# Patient Record
Sex: MALE | Race: NATIVE HAWAIIAN OR OTHER PACIFIC ISLANDER | URBAN - METROPOLITAN AREA
[De-identification: names, ages, dates, MRNs, and addresses within clinical notes are randomized per-mention and may not be internally consistent; named-entity substitution may affect disease eponyms.]

---

## 2018-06-20 ENCOUNTER — MEDICAL CORRESPONDENCE (OUTPATIENT)
Dept: HEALTH INFORMATION MANAGEMENT | Facility: CLINIC | Age: 78
End: 2018-06-20

## 2018-06-20 ENCOUNTER — TRANSFERRED RECORDS (OUTPATIENT)
Dept: HEALTH INFORMATION MANAGEMENT | Facility: CLINIC | Age: 78
End: 2018-06-20

## 2018-06-21 ENCOUNTER — TELEPHONE (OUTPATIENT)
Dept: OPHTHALMOLOGY | Facility: CLINIC | Age: 78
End: 2018-06-21

## 2018-06-21 ENCOUNTER — TRANSFERRED RECORDS (OUTPATIENT)
Dept: HEALTH INFORMATION MANAGEMENT | Facility: CLINIC | Age: 78
End: 2018-06-21

## 2018-06-21 NOTE — TELEPHONE ENCOUNTER
Note to /faciliator for assistance scheduling urgent referral  Notes have been sent per message  Alejo Witt RN 1:57 PM 06/21/18

## 2018-06-21 NOTE — TELEPHONE ENCOUNTER
[facility] Call Center    Phone Message    May a detailed message be left on voicemail: yes    Reason for Call: Other: Cathleen, a nurse  at [employer] called to refer pt for an urgent appt with Glaucoma. I informed her the first available that I could schedule and she said the pt needs to be seen sooner. She will be faxing over pt's records and requests that they be reviewed. Please review and contact pt to schedule. Thanks!     Action Taken: Message routed to:  [facility] ([facility] Eye

## 2018-06-26 ENCOUNTER — OFFICE VISIT (OUTPATIENT)
Dept: OPHTHALMOLOGY | Facility: CLINIC | Age: 78
End: 2018-06-26
Attending: OPHTHALMOLOGY
Payer: COMMERCIAL

## 2018-06-26 DIAGNOSIS — H40.52X0 NVG (NEOVASCULAR GLAUCOMA), LEFT, STAGE UNSPECIFIED: Primary | ICD-10-CM

## 2018-06-26 DIAGNOSIS — H25.20: ICD-10-CM

## 2018-06-26 DIAGNOSIS — Z96.1 PSEUDOPHAKIA OF RIGHT EYE: ICD-10-CM

## 2018-06-26 DIAGNOSIS — H40.50X0 NEOVASCULAR GLAUCOMA: ICD-10-CM

## 2018-06-26 PROCEDURE — G0463 HOSPITAL OUTPT CLINIC VISIT: HCPCS | Mod: ZF

## 2018-06-26 PROCEDURE — 92133 CPTRZD OPH DX IMG PST SGM ON: CPT | Mod: ZF | Performed by: OPHTHALMOLOGY

## 2018-06-26 PROCEDURE — 76514 ECHO EXAM OF EYE THICKNESS: CPT | Mod: ZF | Performed by: OPHTHALMOLOGY

## 2018-06-26 PROCEDURE — 92083 EXTENDED VISUAL FIELD XM: CPT | Mod: ZF | Performed by: OPHTHALMOLOGY

## 2018-06-26 RX ORDER — ACETAZOLAMIDE 500 MG/1
1 CAPSULE, EXTENDED RELEASE ORAL 2 TIMES DAILY
COMMUNITY
Start: 2018-06-20 | End: 2018-07-21

## 2018-06-26 RX ORDER — ENALAPRIL MALEATE 20 MG/1
20 TABLET ORAL ONCE
COMMUNITY
End: 2018-08-28

## 2018-06-26 RX ORDER — BRIMONIDINE TARTRATE 1.5 MG/ML
1 SOLUTION/ DROPS OPHTHALMIC 3 TIMES DAILY
COMMUNITY
Start: 2018-06-20 | End: 2019-05-28

## 2018-06-26 RX ORDER — DORZOLAMIDE HYDROCHLORIDE AND TIMOLOL MALEATE 20; 5 MG/ML; MG/ML
1 SOLUTION/ DROPS OPHTHALMIC 2 TIMES DAILY
COMMUNITY
Start: 2018-06-20 | End: 2019-05-28

## 2018-06-26 ASSESSMENT — VISUAL ACUITY
METHOD_MR: PT DECLINED MR TODAY
OD_SC+: -1
METHOD: SNELLEN - LINEAR
OS_SC: LP
OD_SC: 20/80

## 2018-06-26 ASSESSMENT — SLIT LAMP EXAM - LIDS
COMMENTS: NORMAL
COMMENTS: NORMAL

## 2018-06-26 ASSESSMENT — CUP TO DISC RATIO: OD_RATIO: 0.5

## 2018-06-26 ASSESSMENT — CONF VISUAL FIELD
METHOD: COUNTING FINGERS
OS_INFERIOR_NASAL_RESTRICTION: 1
OS_INFERIOR_TEMPORAL_RESTRICTION: 1
OD_NORMAL: 1
OS_SUPERIOR_NASAL_RESTRICTION: 1
OS_SUPERIOR_TEMPORAL_RESTRICTION: 1

## 2018-06-26 ASSESSMENT — PACHYMETRY
EXAM_DATE: 6/26/2018
OS_CT(UM): 634
OD_CT(UM): 555

## 2018-06-26 ASSESSMENT — TONOMETRY
OD_IOP_MMHG: 10
OS_IOP_MMHG: 22
IOP_METHOD: APPLANATION

## 2018-06-26 NOTE — PATIENT INSTRUCTIONS
Patient will continue on Cosopt (Timolol/Dorzolamide) which is a blue top drop 2x/day (12 hours apart) in the LEFT EYE ONLY and Alphagan (Brimonidine) which is a purple top drop 2x/day (12 hours apart) in the LEFT EYE ONLY.  Patient will discontinue and hold onto the Diamox pills and return to clinic in 2-4 weeks with repeat IOP check, B scan in the left eye and gonioscopy.  Patient will follow up with Dr. Kim for refraction for new glasses.  Pending repeat IOP check will consider proceeding with possible surgery on the left eye.

## 2018-06-26 NOTE — NURSING NOTE
Chief Complaints and History of Present Illnesses   Patient presents with     Glaucoma Evaluation     Referred by [facility] for worsening cataracts and neovasculat glaucoma LE     HPI    Affected eye(s):  Both   Symptoms:           Do you have eye pain now?:  No      Comments:  Referred by [facility] for Glaucoma evaluation and worsening cataracts. Pt notes not vision in LE, no pain. Vision in RE is doing well.     Thais Mejia@ [facility] 1:37 PM June 26, 2018

## 2018-06-26 NOTE — PROGRESS NOTES
1)?NVG OS -- H/O noncompliance with gtts per outside notes -- K pachy:555/634    Tmax:     HVF:  OD:Fluct    CDR: OD:0.5    HRT/OCT: OD:RNFL WNL      FHX of Glc: No      Gonio:       Intolerant to: Diamox -- fatigue, feeling tired      Asthma/COPD: No   Steroid Use: No    Kidney Stones: No    Sulfa Allergy: No      IOP targets: -- IOP accpetable  -- consider mCPC OS   2)PCIOL OD  3)Mature NS OS -- Bscan done previously that reportedly revealed an attached retina  -- IOL calcs done  4)??H/O CRVO OS per old notes s/p Avastin OS at  -- was previously following at  with Dr. Espinoza -- pt reports decrease in vision in April 2014 -- pt reports vision OS in 2018 is the same as it was in 2015 -- states that he lost all vision in 2014     MD:pt ran out of Diamox pills last night    Patient will continue on Cosopt (Timolol/Dorzolamide) which is a blue top drop 2x/day (12 hours apart) in the LEFT EYE ONLY and Alphagan (Brimonidine) which is a purple top drop 2x/day (12 hours apart) in the LEFT EYE ONLY.  Patient will discontinue and hold onto the Diamox pills and return to clinic in 2-4 weeks with repeat IOP check, B scan in the left eye and gonioscopy.  Patient will follow up with Dr. Kim for refraction for new glasses.  Pending repeat IOP check will consider proceeding with possible surgery on the left eye.      Attending Physician Attestation:  Complete documentation of historical and exam elements from today's encounter can be found in the full encounter summary report (not reduplicated in this progress note). I personally obtained the chief complaint(s) and history of present illness.  I confirmed and edited as necessary the review of systems, past medical/surgical history, family history, social history, and examination findings as documented by others; and I examined the patient myself. I personally reviewed the relevant tests, images, and reports as documented above. I formulated and edited as necessary the  assessment and plan and discussed the findings and management plan with the patient and family.  - Rubi Estrada MD

## 2018-06-26 NOTE — MR AVS SNAPSHOT
After Visit Summary   6/26/2018    Jesse Guerrero    MRN: [id number]           Patient Information     Date Of Birth          1940        Visit Information        Provider Department      6/26/2018 1:15 PM Rubi Estrada MD; [facility]        Today's Diagnoses     NVG (neovascular glaucoma), left, stage unspecified    -  1    Hypermature senile cataract        Neovascular glaucoma        Pseudophakia of right eye          Care Instructions    Patient will continue on Cosopt (Timolol/Dorzolamide) which is a blue top drop 2x/day (12 hours apart) in the LEFT EYE ONLY and Alphagan (Brimonidine) which is a purple top drop 2x/day (12 hours apart) in the LEFT EYE ONLY.  Patient will discontinue and hold onto the Diamox pills and return to clinic in 2-4 weeks with repeat IOP check, B scan in the left eye and gonioscopy.  Patient will follow up with Dr. Kim for refraction for new glasses.  Pending repeat IOP check will consider proceeding with possible surgery on the left eye.            Follow-ups after your visit        Follow-up notes from your care team     Return 3-4 weeks with repeat IOP check and Dr. Kim for refraction for new glasses.      Your next 10 appointments already scheduled     Jul 12, 2018  3:00 PM CDT   New Low Vision with Moe Kim, JAYSON   Eye Clinic ([facility])    [facility] [address]  Clinic [address] [place] [id number]   [phone]            Jul 19, 2018  9:15 AM CDT   RETURN GLAUCOMA with Rubi Estrada MD   Eye Clinic ([facility])    [address] [address]   [phone]              Future tests that were ordered for you today     Open Future Orders        Priority Expected Expires Ordered    IOL Biometry w/ IOL calc OU (both eye) Routine  12/26/2019 6/26/2018            Who to contact     Please call  your clinic at [phone] to:    Ask questions about your health    Make or cancel appointments    Discuss your medicines    Learn about your test results    Speak to your doctor            Additional Information About Your Visit        MyChart Information     [employer] is an electronic gateway that provides easy, online access to your medical records. With Cloud[employer], you can request a clinic appointment, read your test results, renew a prescription or communicate with your care team.     To sign up for [employer] visit the website at [email or web address]   You will be asked to enter the access code listed below, as well as some personal information. Please follow the directions to create your username and password.     Your access code is: [email or web address]  Expires: 2018  6:31 AM     Your access code will  in 90 days. If you need help or a new code, please contact your [facility] or call [phone] for assistance.        Care EveryWhere ID     This is your Care EveryWhere ID. This could be used by other organizations to access your [facility] medical records  [id number]         Blood Pressure from Last 3 Encounters:   No data found for BP    Weight from Last 3 Encounters:   No data found for Wt              We Performed the Following     Low Vision Central OD     OCT Optic Nerve RNFL Spectralis OU (both eyes)     PACHYMETRY        Primary Care Provider    None Specified       No primary provider on file.        Equal Access to Services     ERLINDA BRUNO : Hadii jorje travis Sodenia, waaxda luqadaha, qaybta kaalmada vamsi, lobo marquez. So [facility] [phone].    ATENCIÓN: Si habla español, tiene a gilman disposición servicios gratuitos de asistencia lingüística. Llame al [phone].    We comply with applicable federal civil rights laws and [place] laws. We do not discriminate on the basis of race, color, national origin, age, disability,  sex, sexual orientation, or gender identity.            Thank you!     Thank you for choosing EYE CLINIC  for your care. Our goal is always to provide you with excellent care. Hearing back from our patients is one way we can continue to improve our services. Please take a few minutes to complete the written survey that you may receive in the mail after your visit with us. Thank you!             Your Updated Medication List - Protect others around you: Learn how to safely use, store and throw away your medicines at www.disposemymeds.org.          This list is accurate as of 6/26/18 11:59 PM.  Always use your most recent med list.                   Brand Name Dispense Instructions for use Diagnosis    acetaZOLAMIDE 500 MG 12 hr capsule    DIAMOX SEQUEL     Take 1 capsule by mouth 2 times daily For 11 doses        brimonidine 0.15 % ophthalmic solution    ALPHAGAN-P     Place 1 drop Into the left eye 3 times daily        dorzolamide-timolol 2-0.5 % ophthalmic solution    COSOPT     Place 1 drop Into the left eye 2 times daily        enalapril 20 MG tablet    VASOTEC     Take 20 mg by mouth once

## 2018-07-12 ENCOUNTER — OFFICE VISIT (OUTPATIENT)
Dept: OPTOMETRY | Facility: CLINIC | Age: 78
End: 2018-07-12
Payer: COMMERCIAL

## 2018-07-12 DIAGNOSIS — H40.52X0 NVG (NEOVASCULAR GLAUCOMA), LEFT, STAGE UNSPECIFIED: ICD-10-CM

## 2018-07-12 DIAGNOSIS — H52.4 PRESBYOPIA: ICD-10-CM

## 2018-07-12 DIAGNOSIS — H54.1214: Primary | ICD-10-CM

## 2018-07-12 ASSESSMENT — CONF VISUAL FIELD
OS_SUPERIOR_NASAL_RESTRICTION: 1
OS_INFERIOR_NASAL_RESTRICTION: 1
OS_SUPERIOR_TEMPORAL_RESTRICTION: 1
OS_INFERIOR_TEMPORAL_RESTRICTION: 1

## 2018-07-12 ASSESSMENT — SLIT LAMP EXAM - LIDS
COMMENTS: NORMAL
COMMENTS: NORMAL

## 2018-07-12 ASSESSMENT — TONOMETRY
IOP_METHOD: ICARE
OS_IOP_MMHG: 20
OD_IOP_MMHG: 12

## 2018-07-12 ASSESSMENT — REFRACTION_MANIFEST
OD_CYLINDER: +0.50
OD_AXIS: 030
OD_ADD: +3.50
OD_SPHERE: -0.50

## 2018-07-12 NOTE — PROGRESS NOTES
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 Some improvement in BCVA evident OD today. Patient should benefit from bifocal for help with reading/mail. Patient is welcome to RTC if he experiences any prolonged adaptation difficulties with new glasses.     (H40.52X0) NVG (neovascular glaucoma), left, stage unspecified  Plan: Continue care with Dr. Estrada. Patient is scheduled for re-check in 1 week.     (H52.4) Presbyopia  Plan: SRx updated and released.       Complete documentation of historical and exam elements from today's encounter can  be found in the full encounter summary report (not reduplicated in this progress  note). I personally obtained the chief complaint(s) and history of present illness. I  confirmed and edited as necessary the review of systems, past medical/surgical  history, family history, social history, and examination findings as documented by  others; and I examined the patient myself. I personally reviewed the relevant tests,  images, and reports as documented above. I formulated and edited as necessary the  assessment and plan and discussed the findings and management plan with the  patient and family.    Moe Kim, OD

## 2018-07-12 NOTE — MR AVS SNAPSHOT
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    -  1    NVG (neovascular glaucoma), left, stage unspecified        Presbyopia           Follow-ups after your visit        Your next 10 appointments already scheduled     2018  9:15 AM CDT   RETURN GLAUCOMA with Rubi Estrada MD   Eye Clinic ([facility])    [address]  [address]  [facility] [address] [phone]              Who to contact     Please call your clinic at [phone] to:    Ask questions about your health    Make or cancel appointments    Discuss your medicines    Learn about your test results    Speak to your doctor            Additional Information About Your Visit        MyChart Information     [employer] is an electronic gateway that provides easy, online access to your medical records. With [employer], you can request a clinic appointment, read your test results, renew a prescription or communicate with your care team.     To sign up for [employer] visit the website at [email or web address]   You will be asked to enter the access code listed below, as well as some personal information. Please follow the directions to create your username and password.     Your access code is: [email or web address]  Expires: 2018  6:31 AM     Your access code will  in 90 days. If you need help or a new code, please contact your [facility] or call [phone] for assistance.        Care EveryWhere ID     This is your Care EveryWhere ID. This could be used by other organizations to access your [facility]  records  [id number]         Blood Pressure from Last 3 Encounters:   No data found for BP    Weight from Last 3 Encounters:   No data found for Wt              We Performed the Following     REFRACTION [[id number]]        Primary Care Provider    None Specified       No primary provider on file.        Equal Access to Services     ERLINDA BRUNO : Hadii jorje cami matthewo Soartali, wairamda luqadaha, qaybta kaalmada adechu, lobo riosguillermo . So [facility] [phone].    ATENCIÓN: Si habla español, tiene a gilman disposición servicios gratuitos de asistencia lingüística. Llame al [phone].    We comply with applicable federal civil rights laws and [place] laws. We do not discriminate on the basis of race, color, national origin, age, disability, sex, sexual orientation, or gender identity.            Thank you!     Thank you for choosing EYE CLINIC  for your care. Our goal is always to provide you with excellent care. Hearing back from our patients is one way we can continue to improve our services. Please take a few minutes to complete the written survey that you may receive in the mail after your visit with us. Thank you!             Your Updated Medication List - Protect others around you: Learn how to safely use, store and throw away your medicines at www.disposemymeds.org.          This list is accurate as of 7/12/18  3:42 PM.  Always use your most recent med list.                   Brand Name Dispense Instructions for use Diagnosis    acetaZOLAMIDE 500 MG 12 hr capsule    DIAMOX SEQUEL     Take 1 capsule by mouth 2 times daily For 11 doses        brimonidine 0.15 % ophthalmic solution    ALPHAGAN-P     Place 1 drop Into the left eye 3 times daily        dorzolamide-timolol 2-0.5 % ophthalmic solution    COSOPT     Place 1 drop Into the left eye 2 times daily        enalapril 20 MG tablet    VASOTEC     Take 20 mg by mouth once

## 2018-07-19 ENCOUNTER — OFFICE VISIT (OUTPATIENT)
Dept: OPHTHALMOLOGY | Facility: CLINIC | Age: 78
End: 2018-07-19
Attending: OPHTHALMOLOGY
Payer: COMMERCIAL

## 2018-07-19 DIAGNOSIS — H25.22 MORGAGNIAN AGE-RELATED CATARACT OF LEFT EYE: ICD-10-CM

## 2018-07-19 DIAGNOSIS — Z96.1 PSEUDOPHAKIA OF RIGHT EYE: ICD-10-CM

## 2018-07-19 DIAGNOSIS — H40.52X0 NVG (NEOVASCULAR GLAUCOMA), LEFT, STAGE UNSPECIFIED: Primary | ICD-10-CM

## 2018-07-19 PROCEDURE — G0463 HOSPITAL OUTPT CLINIC VISIT: HCPCS | Mod: ZF

## 2018-07-19 RX ORDER — LATANOPROST 50 UG/ML
1 SOLUTION/ DROPS OPHTHALMIC AT BEDTIME
Qty: 1 BOTTLE | Refills: 11 | Status: SHIPPED | OUTPATIENT
Start: 2018-07-19 | End: 2018-07-21

## 2018-07-19 RX ORDER — BRIMONIDINE TARTRATE 2 MG/ML
1 SOLUTION/ DROPS OPHTHALMIC 3 TIMES DAILY
Qty: 15 ML | Refills: 11 | Status: SHIPPED | OUTPATIENT
Start: 2018-07-19 | End: 2019-05-28

## 2018-07-19 RX ORDER — DORZOLAMIDE HYDROCHLORIDE AND TIMOLOL MALEATE 20; 5 MG/ML; MG/ML
1 SOLUTION/ DROPS OPHTHALMIC 2 TIMES DAILY
Qty: 1 BOTTLE | Refills: 11 | Status: SHIPPED | OUTPATIENT
Start: 2018-07-19 | End: 2019-05-28

## 2018-07-19 ASSESSMENT — TONOMETRY
IOP_METHOD: TONOPEN
IOP_METHOD: APPLANATION
OD_IOP_MMHG: 15
OD_IOP_MMHG: 14
OS_IOP_MMHG: 45
OS_IOP_MMHG: ?

## 2018-07-19 ASSESSMENT — PACHYMETRY
OS_CT(UM): 634
EXAM_DATE: 6/26/2018
OD_CT(UM): 555

## 2018-07-19 ASSESSMENT — CONF VISUAL FIELD
OS_SUPERIOR_TEMPORAL_RESTRICTION: 1
OS_INFERIOR_NASAL_RESTRICTION: 1
OS_SUPERIOR_NASAL_RESTRICTION: 1
OS_INFERIOR_TEMPORAL_RESTRICTION: 1
OD_NORMAL: 1
METHOD: COUNTING FINGERS

## 2018-07-19 ASSESSMENT — VISUAL ACUITY
OD_SC+: -1
METHOD: SNELLEN - LINEAR
OS_SC: LP WITH PROJECTION
OD_SC: 20/80

## 2018-07-19 ASSESSMENT — SLIT LAMP EXAM - LIDS
COMMENTS: NORMAL
COMMENTS: NORMAL

## 2018-07-19 NOTE — NURSING NOTE
Chief Complaints and History of Present Illnesses   Patient presents with     Follow Up For     NVG (neovascular glaucoma), left, stage unspecified     HPI    Last Eye Exam:  6/26/18   Affected eye(s):  Left   Symptoms:        Unknown duration    Frequency:  Constant       Do you have eye pain now?:  No      Comments:  Jesse is here for a follow up of NVG (neovascular glaucoma), left, stage unspecified  He says he has been feeling pain LE for the past two days, and yesterday he had took three ibuprofen  As the pain was bad.     Kyle Cordon COT 10:53 AM July 19, 2018

## 2018-07-19 NOTE — PATIENT INSTRUCTIONS
Patient will continue on Cosopt (Timolol/Dorzolamide) which is a blue top drop 2x/day (12 hours apart) in the LEFT EYE ONLY and Alphagan (Brimonidine) which is a purple top drop 2x/day (12 hours apart) in the LEFT EYE ONLY.   Patient will follow up with Dr. Kim for refraction for new glasses.  A long discussion of the risks, benefits, and alternatives including potential treatment and management options were had with patient and a decision was made to proceed with micropulse CPC (laser) in the left eye.

## 2018-07-19 NOTE — PROGRESS NOTES
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 Espinoza -- pt reports decrease in vision in April 2014 upon awakening in morning (denies H/O trauma prior to vision loss) -- pt was evaluated in [place] after vision loss and they did not offer cataract surgery OS because it would not improve his vision ... Was told eye was \"full of blood\"  -- pt reports vision OS in 2018 is the same as it was in 2015 -- states that he lost all vision in 2014     MD:pt needs  -- will call pt to schedule    Patient will continue on Cosopt (Timolol/Dorzolamide) which is a blue top drop 2x/day (12 hours apart) in the LEFT EYE ONLY and Alphagan (Brimonidine) which is a purple top drop 2x/day (12 hours apart) in the LEFT EYE ONLY.   Patient will follow up with Dr. Kim for refraction for new glasses.  A long discussion of the risks, benefits, and alternatives including potential treatment and management options were had with patient and a decision was made to proceed with micropulse CPC (laser) in the left eye.        RESIDENT NOTE:  Here for IOP check  Reports having head ache and eye pain yesterday, took 3 ibuprofen  No pain today  IOP today 49 left eye  B scan: No detachments  Start Diamox pills 500 twice a day  Plan for surgery left eye    Attending Physician Attestation:  Complete documentation of historical and exam elements from today's encounter can be found in the full " encounter summary report (not reduplicated in this progress note). I personally obtained the chief complaint(s) and history of present illness.  I confirmed and edited as necessary the review of systems, past medical/surgical history, family history, social history, and examination findings as documented by others; and I examined the patient myself. I personally reviewed the relevant tests, images, and reports as documented above. I formulated and edited as necessary the assessment and plan and discussed the findings and management plan with the patient and family.  - Rubi Estrada MD

## 2018-07-19 NOTE — MR AVS SNAPSHOT
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   Patient will follow up with Dr. Kim for refraction for new glasses.  A long discussion of the risks, benefits, and alternatives including potential treatment and management options were had with patient and a decision was made to proceed with micropulse CPC (laser) in the left eye.            Follow-ups after your visit        Your next 10 appointments already scheduled     Aug 28, 2018  1:00 PM CDT   Post-Op with Rubi Estrada MD   Eye Clinic ([facility])    [address] [id number] [phone]              Who to contact     Please call your clinic at [phone] to:    Ask questions about your health    Make or cancel appointments    Discuss your medicines    Learn about your test results    Speak to your doctor            Additional Information About Your Visit        MyChart Information     ArtS[employer] is an electronic gateway that provides easy, online access to your medical records. With [employer], you can request a clinic appointment, read your test results, renew a prescription or communicate with your care team.     To sign up for ArtS[employer]t visit the website at [email or web address]   You will be asked to enter the access code  listed below, as well as some personal information. Please follow the directions to create your username and password.     Your access code is: [email or web address]  Expires: 2018  6:31 AM     Your access code will  in 90 days. If you need help or a new code, please contact your [facility] or call [phone] for assistance.        Care EveryWhere ID     This is your Care EveryWhere ID. This could be used by other organizations to access your [facility] medical records  [id number]         Blood Pressure from Last 3 Encounters:   18 (!) 191/105    Weight from Last 3 Encounters:   18 68.5 kg (151 lb)              We Performed the Following     Flores-Operative Worksheet (Glaucoma)          Today's Medication Changes          These changes are accurate as of 18 11:59 PM.  If you have any questions, ask your nurse or doctor.               Start taking these medicines.        Dose/Directions    latanoprost 0.005 % ophthalmic solution   Commonly known as:  XALATAN   Used for:  NVG (neovascular glaucoma), left, stage unspecified   Started by:  Rubi Estrada MD        Dose:  1 drop   Place 1 drop Into the left eye At Bedtime   Quantity:  1 Bottle   Refills:  11         These medicines have changed or have updated prescriptions.        Dose/Directions    * brimonidine 0.15 % ophthalmic solution   Commonly known as:  ALPHAGAN-P   This may have changed:  Another medication with the same name was added. Make sure you understand how and when to take each.   Changed by:  Rubi Estrada MD        Dose:  1 drop   Place 1 drop Into the left eye 3 times daily   Refills:  0       * brimonidine 0.2 % ophthalmic solution   Commonly known as:  ALPHAGAN   This may have changed:  You were already taking a medication with the same name, and this prescription was added. Make sure you understand how and when to take each.   Used for:  NVG (neovascular glaucoma), left, stage  unspecified   Changed by:  Rubi Estrada MD        Dose:  1 drop   Place 1 drop Into the left eye 3 times daily   Quantity:  15 mL   Refills:  11       * dorzolamide-timolol 2-0.5 % ophthalmic solution   Commonly known as:  COSOPT   This may have changed:  Another medication with the same name was added. Make sure you understand how and when to take each.   Changed by:  Rubi Estrada MD        Dose:  1 drop   Place 1 drop Into the left eye 2 times daily   Refills:  0       * dorzolamide-timolol 2-0.5 % ophthalmic solution   Commonly known as:  COSOPT   This may have changed:  You were already taking a medication with the same name, and this prescription was added. Make sure you understand how and when to take each.   Used for:  NVG (neovascular glaucoma), left, stage unspecified   Changed by:  Rubi Estrada MD        Dose:  1 drop   Place 1 drop Into the left eye 2 times daily   Quantity:  1 Bottle   Refills:  11       * Notice:  This list has 4 medication(s) that are the same as other medications prescribed for you. Read the directions carefully, and ask your doctor or other care provider to review them with you.         Where to get your medicines      These medications were sent to [facility] [address] [place] [id number]     Phone:  [phone]     brimonidine 0.2 % ophthalmic solution    dorzolamide-timolol 2-0.5 % ophthalmic solution    latanoprost 0.005 % ophthalmic solution                Primary Care Provider Fax #    Physician No Ref-Primary [phone]       No address on file        Equal Access to Services     [facility]: Hadii aad ku hadasho Soomaali, waaxda luqadaha, qaybta kaalmada adeegyada, lobo marquez. So [facility] [phone].    ATENCIÓN: Si habla español, tiene a gilman disposición servicios gratuitos de asistencia lingüística. Llame al  [phone].    We comply with applicable federal civil rights laws and [place] laws. We do not discriminate on the basis of race, color, national origin, age, disability, sex, sexual orientation, or gender identity.            Thank you!     Thank you for choosing EYE CLINIC  for your care. Our goal is always to provide you with excellent care. Hearing back from our patients is one way we can continue to improve our services. Please take a few minutes to complete the written survey that you may receive in the mail after your visit with us. Thank you!             Your Updated Medication List - Protect others around you: Learn how to safely use, store and throw away your medicines at www.disposemymeds.org.          This list is accurate as of 7/19/18 11:59 PM.  Always use your most recent med list.                   Brand Name Dispense Instructions for use Diagnosis    acetaZOLAMIDE 500 MG 12 hr capsule    DIAMOX SEQUEL     Take 1 capsule by mouth 2 times daily For 11 doses        * brimonidine 0.15 % ophthalmic solution    ALPHAGAN-P     Place 1 drop Into the left eye 3 times daily        * brimonidine 0.2 % ophthalmic solution    ALPHAGAN    15 mL    Place 1 drop Into the left eye 3 times daily    NVG (neovascular glaucoma), left, stage unspecified       * dorzolamide-timolol 2-0.5 % ophthalmic solution    COSOPT     Place 1 drop Into the left eye 2 times daily        * dorzolamide-timolol 2-0.5 % ophthalmic solution    COSOPT    1 Bottle    Place 1 drop Into the left eye 2 times daily    NVG (neovascular glaucoma), left, stage unspecified       enalapril 20 MG tablet    VASOTEC     Take 20 mg by mouth once        latanoprost 0.005 % ophthalmic solution    XALATAN    1 Bottle    Place 1 drop Into the left eye At Bedtime    NVG (neovascular glaucoma), left, stage unspecified       * Notice:  This list has 4 medication(s) that are the same as other medications prescribed for you. Read the directions carefully, and  ask your doctor or other care provider to review them with you.

## 2018-07-21 ENCOUNTER — HOSPITAL ENCOUNTER (EMERGENCY)
Facility: CLINIC | Age: 78
Discharge: HOME | End: 2018-07-21
Attending: EMERGENCY MEDICINE | Admitting: EMERGENCY MEDICINE
Payer: COMMERCIAL

## 2018-07-21 VITALS
OXYGEN SATURATION: 96 % | HEIGHT: 68 IN | SYSTOLIC BLOOD PRESSURE: 191 MMHG | BODY MASS INDEX: 22.88 KG/M2 | DIASTOLIC BLOOD PRESSURE: 105 MMHG | RESPIRATION RATE: 16 BRPM | WEIGHT: 151 LBS | HEART RATE: 68 BPM | TEMPERATURE: 97.6 F

## 2018-07-21 DIAGNOSIS — H40.002 GLAUCOMA SUSPECT, LEFT: ICD-10-CM

## 2018-07-21 DIAGNOSIS — H40.52X0 NVG (NEOVASCULAR GLAUCOMA), LEFT, STAGE UNSPECIFIED: ICD-10-CM

## 2018-07-21 LAB
ANION GAP SERPL CALCULATED.3IONS-SCNC: 8 MMOL/L (ref 3–14)
BUN SERPL-MCNC: 19 MG/DL (ref 7–30)
CALCIUM SERPL-MCNC: 9.4 MG/DL (ref 8.5–10.1)
CHLORIDE SERPL-SCNC: 104 MMOL/L (ref 94–109)
CO2 SERPL-SCNC: 27 MMOL/L (ref 20–32)
CREAT SERPL-MCNC: 1.02 MG/DL (ref 0.66–1.25)
GFR SERPL CREATININE-BSD FRML MDRD: 71 ML/MIN/1.7M2
GLUCOSE SERPL-MCNC: 97 MG/DL (ref 70–99)
POTASSIUM SERPL-SCNC: 4 MMOL/L (ref 3.4–5.3)
SODIUM SERPL-SCNC: 139 MMOL/L (ref 133–144)

## 2018-07-21 PROCEDURE — 99283 EMERGENCY DEPT VISIT LOW MDM: CPT | Performed by: EMERGENCY MEDICINE

## 2018-07-21 PROCEDURE — 36415 COLL VENOUS BLD VENIPUNCTURE: CPT | Performed by: EMERGENCY MEDICINE

## 2018-07-21 PROCEDURE — 80048 BASIC METABOLIC PNL TOTAL CA: CPT | Performed by: EMERGENCY MEDICINE

## 2018-07-21 PROCEDURE — 25000132 ZZH RX MED GY IP 250 OP 250 PS 637: Performed by: EMERGENCY MEDICINE

## 2018-07-21 PROCEDURE — 99284 EMERGENCY DEPT VISIT MOD MDM: CPT | Mod: Z6 | Performed by: EMERGENCY MEDICINE

## 2018-07-21 RX ORDER — ENALAPRIL MALEATE 20 MG/1
20 TABLET ORAL DAILY
Status: DISCONTINUED | OUTPATIENT
Start: 2018-07-21 | End: 2018-07-21 | Stop reason: HOSPADM

## 2018-07-21 RX ORDER — ACETAZOLAMIDE 500 MG/1
500 CAPSULE, EXTENDED RELEASE ORAL 2 TIMES DAILY
Qty: 30 CAPSULE | Refills: 0 | Status: SHIPPED | OUTPATIENT
Start: 2018-07-21 | End: 2018-07-31

## 2018-07-21 RX ORDER — HYDROCHLOROTHIAZIDE 12.5 MG/1
12.5 TABLET ORAL DAILY
COMMUNITY
End: 2018-08-28

## 2018-07-21 RX ORDER — ACETAZOLAMIDE 250 MG/1
500 TABLET ORAL ONCE
Status: COMPLETED | OUTPATIENT
Start: 2018-07-21 | End: 2018-07-21

## 2018-07-21 RX ORDER — LATANOPROST 50 UG/ML
1 SOLUTION/ DROPS OPHTHALMIC AT BEDTIME
Qty: 1 BOTTLE | Refills: 11 | Status: SHIPPED | OUTPATIENT
Start: 2018-07-21 | End: 2018-07-31

## 2018-07-21 RX ADMIN — ACETAZOLAMIDE 500 MG: 250 TABLET ORAL at 18:06

## 2018-07-21 RX ADMIN — ENALAPRIL MALEATE 20 MG: 20 TABLET ORAL at 16:24

## 2018-07-21 RX ADMIN — ACETAZOLAMIDE 500 MG: 250 TABLET ORAL at 16:49

## 2018-07-21 ASSESSMENT — ENCOUNTER SYMPTOMS
VOMITING: 0
SHORTNESS OF BREATH: 0
PHOTOPHOBIA: 1
SLEEP DISTURBANCE: 1
EYE PAIN: 1
ABDOMINAL PAIN: 0
FEVER: 0
BACK PAIN: 0

## 2018-07-21 NOTE — ED AVS SNAPSHOT
[facility], Emergency Department    [address] [address]    Phone:  [phone]                                       Jesse Guerrero   MRN: [id number]    Department:  [facility], Emergency Department   Date of Visit:  7/21/2018           Patient Information     Date Of Birth          1940        Your diagnoses for this visit were:     Glaucoma suspect, left     NVG (neovascular glaucoma), left, stage unspecified        You were seen by Mike Minaya MD.        Discharge Instructions       Please make an appointment to follow up with Eye Clinic (phone: [phone]), they will call Monday to schedule your laser surgery.    Continue your cosopt twice a day and   Continue alphagan three times a day   Add Latanaprost once a day before bed  Take the diamox tablet twice a day.    Return if fever, worse headache.    24 Hour Appointment Hotline       To make an appointment at any [facility] clinic, call [phone] ([phone]). If you don't have a family doctor or clinic, we will help you find one. [facility] clinics are conveniently located to serve the needs of you and your family.             Review of your medicines      START taking        Dose / Directions Last dose taken    acetaZOLAMIDE 500 MG 12 hr capsule   Commonly known as:  DIAMOX SEQUELS   Dose:  500 mg   Quantity:  30 capsule        Take 1 capsule (500 mg) by mouth 2 times daily   Refills:  0          Our records show that you are taking the medicines listed below. If these are incorrect, please call your family doctor or clinic.        Dose / Directions Last dose taken    * brimonidine 0.15 % ophthalmic solution   Commonly known as:  ALPHAGAN-P   Dose:  1 drop        Place 1 drop Into the left eye 3 times daily   Refills:  0        * brimonidine 0.2 % ophthalmic solution   Commonly known as:  ALPHAGAN   Dose:  1 drop   Quantity:  15 mL        Place 1 drop Into the left eye 3 times daily   Refills:  11         * dorzolamide-timolol 2-0.5 % ophthalmic solution   Commonly known as:  COSOPT   Dose:  1 drop        Place 1 drop Into the left eye 2 times daily   Refills:  0        * dorzolamide-timolol 2-0.5 % ophthalmic solution   Commonly known as:  COSOPT   Dose:  1 drop   Quantity:  1 Bottle        Place 1 drop Into the left eye 2 times daily   Refills:  11        enalapril 20 MG tablet   Commonly known as:  VASOTEC   Dose:  20 mg        Take 20 mg by mouth once   Refills:  0        hydrochlorothiazide 12.5 MG Tabs tablet   Dose:  12.5 mg        Take 12.5 mg by mouth daily   Refills:  0        latanoprost 0.005 % ophthalmic solution   Commonly known as:  XALATAN   Dose:  1 drop   Quantity:  1 Bottle        Place 1 drop Into the left eye At Bedtime   Refills:  11        * Notice:  This list has 4 medication(s) that are the same as other medications prescribed for you. Read the directions carefully, and ask your doctor or other care provider to review them with you.            Prescriptions were sent or printed at these locations (2 Prescriptions)                   Other Prescriptions                Printed at Department/Unit printer (2 of 2)         acetaZOLAMIDE (DIAMOX SEQUELS) 500 MG 12 hr capsule               latanoprost (XALATAN) 0.005 % ophthalmic solution                Procedures and tests performed during your visit     Basic metabolic panel      Orders Needing Specimen Collection     None      Pending Results     No orders found from 7/19/2018 to 7/22/2018.            Pending Culture Results     No orders found from 7/19/2018 to 7/22/2018.            Pending Results Instructions     If you had any lab results that were not finalized at the time of your Discharge, you can call the ED Lab Result RN at [phone]. You will be contacted by this team for any positive Lab results or changes in treatment. The nurses are available 7 days a week from 10A to 6:30P.  You can leave a message 24 hours per day and they  "will return your call.        Thank you for choosing [facility]       Thank you for choosing [facility] for your care. Our goal is always to provide you with excellent care. Hearing back from our patients is one way we can continue to improve our services. Please take a few minutes to complete the written survey that you may receive in the mail after you visit with us. Thank you!        [employer] Information     [employer] lets you send messages to your doctor, view your test results, renew your prescriptions, schedule appointments and more. To sign up, go to [email or web address] . Click on \"Log in\" on the left side of the screen, which will take you to the Welcome page. Then click on \"Sign up Now\" on the right side of the page.     You will be asked to enter the access code listed below, as well as some personal information. Please follow the directions to create your username and password.     Your access code is: [email or web address]  Expires: 2018  6:31 AM     Your access code will  in 90 days. If you need help or a new code, please call your [facility] clinic or [phone].        Care EveryWhere ID     This is your Care EveryWhere ID. This could be used by other organizations to access your [facility] medical records  [id number]        Equal Access to Services     ERLINDA BRUNO : Nuvia castroo Devante, waaxda luqadaha, qaybta kaalmada adeegyada, lobo marquez. So [facility] [phone].    ATENCIÓN: Si habla español, tiene a gilman disposición servicios gratuitos de asistencia lingüística. Llame al [phone].    We comply with applicable federal civil rights laws and [place] laws. We do not discriminate on the basis of race, color, national origin, age, disability, sex, sexual orientation, or gender identity.            After Visit Summary       This is your record. Keep this with you and show to your community pharmacist(s) and doctor(s) at your next visit.                  "

## 2018-07-21 NOTE — DISCHARGE INSTRUCTIONS
Please make an appointment to follow up with Eye Clinic (phone: [phone]), they will call Monday to schedule your laser surgery.    Continue your cosopt twice a day and   Continue alphagan three times a day   Add Latanaprost once a day before bed  Take the diamox tablet twice a day.    Return if fever, worse headache.

## 2018-07-21 NOTE — ED AVS SNAPSHOT
[facility], [place], Emergency Department    [address] [place] [id number]    Phone:  [phone]                                       Jesse Guerrero   MRN: [id number]    Department:  [facility], Emergency Department   Date of Visit:  7/21/2018           After Visit Summary Signature Page     I have received my discharge instructions, and my questions have been answered. I have discussed any challenges I see with this plan with the nurse or doctor.    ..........................................................................................................................................  Patient/Patient Representative Signature      ..........................................................................................................................................  Patient Representative Print Name and Relationship to Patient    ..................................................               ................................................  Date                                            Time    ..........................................................................................................................................  Reviewed by Signature/Title    ...................................................              ..............................................  Date                                                            Time

## 2018-07-21 NOTE — ED PROVIDER NOTES
"  History     Chief Complaint   Patient presents with     Eye Problem     The history is provided by the patient. The history is limited by a language barrier. A  was used (Lithuanian).     Jesse Guerrero is a 77 year old male with neovascular glaucoma s/p Avastin with paracentesis (4/20/18) of the left eye who presents to the Emergency Department for left eye pain and photophobia. The patient reports that he has not slept all night due to the pain, and was advised on the 19 th that if the pain worsened he should come to the ED. He reports using an ice pack and cold water on his head and ibuprofen without relief. The patient has better vision on his right eye, no vision in the left eye. He states the doctor canceled the diamox pills, and had him continue Cosopt and brimonidine drops. No dosage changes. He states the pain in his left eye \"feels like a pulse, pinching\" and he is unable to provide a direct location, it is \"driving me crazy\".The pain began April 16 th, and it hurt April 17 th for the second time. He reports that the pain began at 11 am, and last night around 7 pm. He states it did not hurt after the Opthalmology appointment 7/19.     I have reviewed the Medications, Allergie ,Past Medical and Surgical History, and Social History in the Epicssystem.     Past Medical History:   Diagnosis Date     Glaucoma (increased eye pressure)     Dx with neovascular glaucoma LE     Nonsenile cataract        Past Surgical History:   Procedure Laterality Date     CATARACT IOL, RT/LT  2014    back in [place] RE       Family History   Problem Relation Age of Onset     Cataracts Mother      Glaucoma No family hx of      Macular Degeneration No family hx of      Diabetes No family hx of        Social History   Substance Use Topics     Smoking status: Former Smoker     Smokeless tobacco: Never Used     Alcohol use No       Current Facility-Administered Medications   Medication     enalapril (VASOTEC) " "tablet 20 mg     Current Outpatient Prescriptions   Medication     acetaZOLAMIDE (DIAMOX SEQUELS) 500 MG 12 hr capsule     brimonidine (ALPHAGAN) 0.2 % ophthalmic solution     brimonidine (ALPHAGAN-P) 0.15 % ophthalmic solution     dorzolamide-timolol (COSOPT) 2-0.5 % ophthalmic solution     enalapril (VASOTEC) 20 MG tablet     hydrochlorothiazide 12.5 MG TABS tablet     latanoprost (XALATAN) 0.005 % ophthalmic solution     dorzolamide-timolol (COSOPT) 2-0.5 % ophthalmic solution     [DISCONTINUED] acetaZOLAMIDE (DIAMOX SEQUEL) 500 MG 12 hr capsule     [DISCONTINUED] latanoprost (XALATAN) 0.005 % ophthalmic solution      No Known Allergies    Review of Systems   Constitutional: Negative for fever.   Eyes: Positive for photophobia and pain (L).   Respiratory: Negative for shortness of breath.    Cardiovascular: Negative for chest pain.   Gastrointestinal: Negative for abdominal pain and vomiting.   Musculoskeletal: Negative for back pain.   Psychiatric/Behavioral: Positive for sleep disturbance (decreased due to eye pain).       Physical Exam   BP: 167/88  Pulse: 68  Temp: 97.6  F (36.4  C)  Resp: 16  Height: 172.7 cm (5' 8\")  Weight: 68.5 kg (151 lb)  SpO2: 97 %      Physical Exam   Constitutional: He is oriented to person, place, and time. He appears well-developed and well-nourished. No distress.   HENT:   Head: Normocephalic and atraumatic.   Eyes: No scleral icterus.   Left eye unreactive, grossly normal anterior chamber but opaque behind the pupil.  No proptosis or injection of the conjunctiva   Neck: Normal range of motion. Neck supple.   Cardiovascular:   No murmur heard.  Neurological: He is alert and oriented to person, place, and time.   Skin: Skin is warm and dry. No rash noted. He is not diaphoretic. No erythema. No pallor.       ED Course     ED Course     Procedures             Critical Care time:  none             Labs Ordered and Resulted from Time of ED Arrival Up to the Time of Departure from the " ED   BASIC METABOLIC PANEL            Assessments & Plan (with Medical Decision Making)   The patient has left eye pain secondary to an elevated eye pressure.  He was seen by ophthalmology who found a pressure of 60.  He was started on his eyedrops in the ER and given Diamox.  He was also hypertensive and was treated with his enalapril.  He has not wanted to take Diamox in the past but has had some better outcomes when he is on that.  Ophthalmology observing for several hours, and over the course of multiple rechecks his eye pressure continued to improve.  He was given a second dose of Diamox and instructed him to take 1 final dose before bed tonight as well as his 3 eyedrops.  Refills were provided.  They will call him tomorrow to schedule laser surgery for the eye to definitively treat the glaucoma.    I have reviewed the nursing notes.    I have reviewed the findings, diagnosis, plan and need for follow up with the patient.    New Prescriptions    ACETAZOLAMIDE (DIAMOX SEQUELS) 500 MG 12 HR CAPSULE    Take 1 capsule (500 mg) by mouth 2 times daily       Final diagnoses:   Glaucoma suspect, left   IJudie, am serving as a trained medical scribe to document services personally performed by Mike Minaya MD, based on the provider's statements to me.   I, Mike Minaya MD, was physically present and have reviewed and verified the accuracy of this note documented by Judie Obrien.      7/21/2018   [facility], [place], EMERGENCY DEPARTMENT     Mike Minaya MD  07/21/18 1946

## 2018-07-21 NOTE — ED TRIAGE NOTES
77 year old male presents to complaints of left eye pain.  Patient was seen in clinic on Thursday and was told to come here if pain continued or got worse.

## 2018-07-21 NOTE — CONSULTS
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 Pain had happened a few times in the past as well, including 2 times this past week. The patient reports that he has not slept all night due to the pain. Saw Dr. Estrada on 7/19 and had planned for CPC in future, as well as plans for pt to be placed on diamox 500mg BID. Over past day, pt reports using an ice pack and cold water on his head and ibuprofen without relief. Per pt, right eye sees well, but he has essentially no vision in left eye. Has been using Cosopt and brimonidine drops in OS.     10+ review of systems were otherwise negative except for that which has been stated above.      OCULAR/MEDICAL/SURGICAL HISTORIES:     Past Ocular History:  See HPI    Past Medical History:   Diagnosis Date     Glaucoma (increased eye pressure)     Dx with neovascular glaucoma LE     Nonsenile cataract        Past Surgical History:   Procedure Laterality Date     CATARACT IOL, RT/LT  2014    back in [place] RE       EXAMINATION:     Visual Acuity (assessed with near card): Right Eye 20/60 ; Left Eye 20/NLP vs LP   Pupils: Left pupil cloudy and nonreactive, right pupil 5mm in dark, reactive to 3.5 mm w/ light    Intraocular Pressure: RE  20 ; LE 60 on first check, down to 45 after 3 rounds of drops  mmHg by tonopen (<5% error).   Motility: RE Full; LE Full  Confrontational Visual Field: RE Full; LE NLP vs. LP    External/Slit Lamp Exam   RIGHT EYE   Lids/Lashes: No Abnormality   Conj/Sclera: White and Quiet   Cornea:  Clear   Ant Chamber:  Deep and Quiet   Iris: Round and Reactive   Lens: PC IOL in place  LEFT   Lids/lashes: No  Abnormality   Conj/Sclera: White and Quiet   Cornea:  Peripheral vascularization, diffuse microcystic edema   Ant Chamber:  Hyphema inferiorly - 0.5mm   Iris: No reaction - 3mm, w/ iris vascularization   Lens:brunescent cataract    Dilated Fundus Exam  Eyes Dilated? no 2/2 elevated IOP      Labs/Studies/Imaging Performed  none     ASSESSMENT/PLAN:     Jesse Guerrero is a 77 year old male who presents with left eye NLP vs. LP, and left eye pain.     Neovascular Glaucoma  -This vision is at baseline for pt. Eye pain is new.   -left eye IOP was 60mm on presentation, and dropped to 45mm after 3 rounds of 3 drops (bromonidine, timolol, dorzolamide) and 1500mg of diamox IR.  -Pt was given Rx for diamox.   -Pt will f/u in clinic w/ Dr. Estrada on 7/25 for CPC to address neovascular glaucoma.     Hyphema  -will not wash out AC d/t pt being NLP vs. LP.   -Pt can contact office about emergent CPC if pain returns before 7/25.         It is our pleasure to participate in this patient's care and treatment. Please contact us with any further questions or concerns.      Miguel Demarco M.D.  PGY-2  Ophthalmology   Pager: [phone]    Seen and discussed with Toño Vivas MD - PGY-3  Discussed w/ Dr. Sean MD    Teaching Statement  I did not examine the patient, but was available to see the patient if needed. I have discussed the case with the resident and the assessment and plan as documented seems reasonable to me.    Harper Mazariegos MD  Comprehensive Ophthalmology & Ocular Pathology  Department of Ophthalmology and Visual Neurosciences  [email or web address]  Pager [phone]

## 2018-07-27 ENCOUNTER — TELEPHONE (OUTPATIENT)
Dept: OPHTHALMOLOGY | Facility: CLINIC | Age: 78
End: 2018-07-27

## 2018-07-30 ENCOUNTER — TRANSFERRED RECORDS (OUTPATIENT)
Dept: HEALTH INFORMATION MANAGEMENT | Facility: CLINIC | Age: 78
End: 2018-07-30

## 2018-07-31 ENCOUNTER — OFFICE VISIT (OUTPATIENT)
Dept: OPHTHALMOLOGY | Facility: CLINIC | Age: 78
End: 2018-07-31
Attending: OPHTHALMOLOGY
Payer: COMMERCIAL

## 2018-07-31 DIAGNOSIS — Z98.890 POST-OPERATIVE STATE: Primary | ICD-10-CM

## 2018-07-31 DIAGNOSIS — H40.52X0 NVG (NEOVASCULAR GLAUCOMA), LEFT, STAGE UNSPECIFIED: ICD-10-CM

## 2018-07-31 PROCEDURE — G0463 HOSPITAL OUTPT CLINIC VISIT: HCPCS | Mod: ZF

## 2018-07-31 RX ORDER — ATROPINE SULFATE 10 MG/ML
1-2 SOLUTION/ DROPS OPHTHALMIC 2 TIMES DAILY
Qty: 1 BOTTLE | Refills: 11 | Status: SHIPPED | OUTPATIENT
Start: 2018-07-31 | End: 2019-05-28

## 2018-07-31 RX ORDER — PREDNISOLONE ACETATE 10 MG/ML
1 SUSPENSION/ DROPS OPHTHALMIC 4 TIMES DAILY
COMMUNITY
Start: 2018-07-30 | End: 2019-05-28

## 2018-07-31 ASSESSMENT — TONOMETRY
IOP_METHOD: APPLANATION
OD_IOP_MMHG: 10
OS_IOP_MMHG: 25

## 2018-07-31 ASSESSMENT — REFRACTION_WEARINGRX
OS_SPHERE: BALANCE
OD_SPHERE: -0.50
OD_CYLINDER: +0.50
OD_AXIS: 030
OD_ADD: +3.50

## 2018-07-31 ASSESSMENT — CONF VISUAL FIELD
OD_SUPERIOR_NASAL_RESTRICTION: 3
OS_INFERIOR_NASAL_RESTRICTION: 1
OS_SUPERIOR_NASAL_RESTRICTION: 1
OS_SUPERIOR_TEMPORAL_RESTRICTION: 1
OS_INFERIOR_TEMPORAL_RESTRICTION: 1

## 2018-07-31 ASSESSMENT — VISUAL ACUITY
OD_SC: 20/100
METHOD: SNELLEN - LINEAR
OS_SC: NLP?

## 2018-07-31 ASSESSMENT — SLIT LAMP EXAM - LIDS
COMMENTS: NORMAL
COMMENTS: NORMAL

## 2018-07-31 NOTE — PROGRESS NOTES
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 He was in ED on 7/19/18 for 2 day h/o left eye pain and photophobia - had episode of ocular HTN w/ left eye IOP up to 60 mmHg, but went down to 45 w/ diamox IR 1500mg and 3 rounds of bromonidine, timolol, dorzolamide. Has only been on prednisilone drops since procedure. Pt had CPC yesterday w/ Dr. Estrada. Pt reports that after anesthesia wore off, he had severe pain in his left eye that spread to his head. The pain became nearly unbearable, and he checked his blood pressure - it was 220 systolic. He took enalapril, atenolol, and hydrochlorothiazide at that time. By midnight, these had dropped SBP down to 169. He checked BP this AM - 156/97, at 9AM it was 134/81.     Pt eye pain improved this AM. Notes improvement w/ Prednisolone drops. Still some pain in brow. Pain yesterday was 8-9. Today it is 2.     Came to clinic because of post-op pain. Wanted to schedule f/u appt in 3-4 weeks. Currently only on prednisilone 1%, 1 drop QID.     Assessment & Plan      Jesse Guerrero is a 77 year old male who had CPC w/ Dr. Estrada yesterday at  with the following diagnoses:   1. Post-operative state    2. NVG (neovascular glaucoma), left, stage unspecified       -Pt's post-procedure pain appears to be improving.  -left eye pressure improved today at 25 mm Hg  -Cont Prednisilone drops as prescribed  -Instructed pt to re-start Cosopt and Alphagan left eye   -Start Atropine twice a day left eye     -F/u w/ Dr. Estrada in 2 weeks      Patient disposition:   Return in about 2 weeks (around 8/14/2018) for PO exam.          Miguel Demarco M.D., PGY-2    Attending Physician Attestation:  Complete documentation of historical and exam elements from today's encounter can be found in the full encounter summary report (not  reduplicated in this progress note).  I personally obtained the chief complaint(s) and history of present illness.  I confirmed and edited as necessary the review of systems, past medical/surgical history, family history, social history, and examination findings as documented by others; and I examined the patient myself.  I personally reviewed the relevant tests, images, and reports as documented above.  I formulated and edited as necessary the assessment and plan and discussed the findings and management plan with the patient and family. . - Johnny Elizondo MD

## 2018-07-31 NOTE — NURSING NOTE
"Chief Complaints and History of Present Illnesses   Patient presents with     Eye Pain Left Eye     POD #1 s/p micropulse CPC (laser) in the left eye     HPI    Affected eye(s):  Left   Symptoms:     No redness   No Dryness   No itching         Do you have eye pain now?:  Yes   Location:  OS   Pain Level:  Severe Pain (7)   Pain Frequency:  Constant   Pain Characteristics:  Stabbing      Comments:  Pt here with  over the phone.  Pt POD#1 s/p  micropulse CPC (laser) in the left eye. Pt having stabbing eye pain in LE since surgery. Pt describes eye pain as \"someone driving a nail into his LE\".    Radha SANCHEZ July 31, 2018 9:50 AM               "

## 2018-08-28 ENCOUNTER — OFFICE VISIT (OUTPATIENT)
Dept: OPHTHALMOLOGY | Facility: CLINIC | Age: 78
End: 2018-08-28
Attending: OPHTHALMOLOGY
Payer: COMMERCIAL

## 2018-08-28 DIAGNOSIS — H40.52X0 NVG (NEOVASCULAR GLAUCOMA), LEFT, STAGE UNSPECIFIED: ICD-10-CM

## 2018-08-28 DIAGNOSIS — Z96.1 PSEUDOPHAKIA OF RIGHT EYE: Primary | ICD-10-CM

## 2018-08-28 PROCEDURE — G0463 HOSPITAL OUTPT CLINIC VISIT: HCPCS | Mod: ZF

## 2018-08-28 ASSESSMENT — VISUAL ACUITY
OS_SC: NLP
OD_SC: 20/100
OD_PH_SC: 20/50-2
OD_SC+: -1
METHOD: SNELLEN - LINEAR

## 2018-08-28 ASSESSMENT — CONF VISUAL FIELD
OS_INFERIOR_TEMPORAL_RESTRICTION: 1
OS_INFERIOR_NASAL_RESTRICTION: 1
OS_SUPERIOR_NASAL_RESTRICTION: 1
METHOD: COUNTING FINGERS
OD_NORMAL: 1
OS_SUPERIOR_TEMPORAL_RESTRICTION: 1

## 2018-08-28 ASSESSMENT — TONOMETRY
OD_IOP_MMHG: 16
IOP_METHOD: APPLANATION
OS_IOP_MMHG: 28

## 2018-08-28 ASSESSMENT — SLIT LAMP EXAM - LIDS
COMMENTS: NORMAL
COMMENTS: NORMAL

## 2018-08-28 NOTE — NURSING NOTE
Chief Complaints and History of Present Illnesses   Patient presents with     Post Op (Ophthalmology) Left Eye      4 weeks after micropulse CPC (laser)      HPI    Last Eye Exam:  7/19/18   Affected eye(s):  Left   Symptoms:        Duration:  4 weeks   Frequency:  Constant       Do you have eye pain now?:  No      Comments:  Jesse is here post op 4 weeks after micropulse CPC (laser) LE.  He says his LE sometimes feels a force inside but no pain or discomfort. He also sometimes gets a discharge from his LEThis happens mostly in the morning.  He brought his drops with him today.    Kyle Cordon COT 3:48 PM August 28, 2018

## 2018-08-28 NOTE — PROGRESS NOTES
"1)?NVG OS -- s/p mCPC OS (7/30/18), H/O noncompliance with gtts per outside notes -- K pachy:555/634    Tmax:     HVF:  OD:Fluct    CDR: OD:0.5    HRT/OCT: OD:RNFL WNL    FHX of Glc: No      Gonio:       Intolerant to: Diamox -- fatigue, feeling tired      Asthma/COPD: No   Steroid Use: No    Kidney Stones: No    Sulfa Allergy: No      IOP targets: OS:Comfort  -- IOP above target   --    2)PCIOL OD c PCO OD -- done in [place]   3)Mature NS OS -- Bscan done previously that reportedly revealed an attached retina  -- IOL calcs done  4)??H/O CRVO OS per old notes s/p Avastin OS at  -- was previously following at  with Dr. Espinoza -- pt reports decrease in vision in April 2014 upon awakening in morning (denies H/O trauma prior to vision loss) -- pt was evaluated in [place] after vision loss and they did not offer cataract surgery OS because it would not improve his vision ... Was told eye was \"full of blood\"  -- pt reports vision OS in 2018 is the same as it was in 2015 -- states that he lost all vision in 2014     MD:pt needs  -- will call pt to schedule    Patient will continue on Cosopt (Timolol/Dorzolamide) which is a blue top drop 2x/day (12 hours apart) in the LEFT EYE ONLY and Alphagan (Brimonidine) which is a purple top drop 2x/day (12 hours apart) in the LEFT EYE ONLY.  Patient will follow up with Optometry for new glasses prescription.    Patient will return to clinic in 3-4 months with repeat IOP check and dilated eye exam.        Attending Physician Attestation:  Complete documentation of historical and exam elements from today's encounter can be found in the full encounter summary report (not reduplicated in this progress note). I personally obtained the chief complaint(s) and history of present illness.  I confirmed and edited as necessary the review of systems, past medical/surgical history, family history, social history, and examination findings as documented by others; and I examined " the patient myself. I personally reviewed the relevant tests, images, and reports as documented above. I formulated and edited as necessary the assessment and plan and discussed the findings and management plan with the patient and family.  - Rubi Estrada MD

## 2018-08-28 NOTE — PATIENT INSTRUCTIONS
Patient will continue on Cosopt (Timolol/Dorzolamide) which is a blue top drop 2x/day (12 hours apart) in the LEFT EYE ONLY and Alphagan (Brimonidine) which is a purple top drop 2x/day (12 hours apart) in the LEFT EYE ONLY.  Patient will follow up with Optometry for new glasses prescription.    Patient will return to clinic in 3-4 months with repeat IOP check and dilated eye exam.

## 2018-11-27 ENCOUNTER — OFFICE VISIT (OUTPATIENT)
Dept: OPHTHALMOLOGY | Facility: CLINIC | Age: 78
End: 2018-11-27
Attending: OPHTHALMOLOGY
Payer: COMMERCIAL

## 2018-11-27 DIAGNOSIS — H40.52X0 NVG (NEOVASCULAR GLAUCOMA), LEFT, STAGE UNSPECIFIED: Primary | ICD-10-CM

## 2018-11-27 DIAGNOSIS — Z96.1 PSEUDOPHAKIA OF RIGHT EYE: ICD-10-CM

## 2018-11-27 DIAGNOSIS — H25.22 MORGAGNIAN AGE-RELATED CATARACT OF LEFT EYE: ICD-10-CM

## 2018-11-27 PROCEDURE — G0463 HOSPITAL OUTPT CLINIC VISIT: HCPCS | Mod: ZF

## 2018-11-27 RX ORDER — BRIMONIDINE TARTRATE 2 MG/ML
1 SOLUTION/ DROPS OPHTHALMIC 3 TIMES DAILY
Qty: 15 ML | Refills: 11 | Status: SHIPPED | OUTPATIENT
Start: 2018-11-27 | End: 2019-08-20

## 2018-11-27 RX ORDER — DORZOLAMIDE HYDROCHLORIDE AND TIMOLOL MALEATE 20; 5 MG/ML; MG/ML
1 SOLUTION/ DROPS OPHTHALMIC 2 TIMES DAILY
Qty: 1 BOTTLE | Refills: 11 | Status: SHIPPED | OUTPATIENT
Start: 2018-11-27 | End: 2019-08-20

## 2018-11-27 ASSESSMENT — VISUAL ACUITY
OD_SC: 20/100
OD_PH_SC: 20/50-1
METHOD: SNELLEN - LINEAR
OD_SC+: +1
OS_SC: LP WITH PROJECTION

## 2018-11-27 ASSESSMENT — TONOMETRY
OD_IOP_MMHG: 16
OS_IOP_MMHG: 4
OS_IOP_MMHG: 10
OD_IOP_MMHG: 13
IOP_METHOD: APPLANATION
IOP_METHOD: APPLANATION

## 2018-11-27 ASSESSMENT — CONF VISUAL FIELD
OS_SUPERIOR_NASAL_RESTRICTION: 1
OD_NORMAL: 1
OS_INFERIOR_NASAL_RESTRICTION: 1
METHOD: COUNTING FINGERS
OS_SUPERIOR_TEMPORAL_RESTRICTION: 1
OS_INFERIOR_TEMPORAL_RESTRICTION: 1

## 2018-11-27 ASSESSMENT — CUP TO DISC RATIO: OD_RATIO: 0.5

## 2018-11-27 ASSESSMENT — SLIT LAMP EXAM - LIDS
COMMENTS: NORMAL
COMMENTS: NORMAL

## 2018-11-27 NOTE — MR AVS SNAPSHOT
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  Patient will follow up with Optometry for new glasses prescription.    Patient will return to clinic in 6 months with repeat IOP check and dilated eye exam and repeat discussion about possible surgery in the left eye.             Follow-ups after your visit        Follow-up notes from your care team     Return  6-9 months with repeat IOP check and dilated eye exam. .      Your next 10 appointments already scheduled     May 28, 2019  2:00 PM CDT   RETURN GLAUCOMA with Rubi Estrada MD   Eye Clinic ([facility])    [address] [facility] [address] [place] [id number] [phone]              Who to contact     Please call your clinic at [phone] to:    Ask questions about your health    Make or cancel appointments    Discuss your medicines    Learn about your test results    Speak to your doctor            Additional Information About Your Visit        MyChart Information     Preact is an electronic gateway that provides easy, online access to your medical records. With Preact, you can request a clinic appointment, read your test results, renew a prescription or communicate with your care team.     To sign up for [employer] visit the website at  [email or web address]   You will be asked to enter the access code listed below, as well as some personal information. Please follow the directions to create your username and password.     Your access code is: [email or web address]  Expires: 2019  3:53 PM     Your access code will  in 90 days. If you need help or a new code, please contact your [facility] or call [phone] for assistance.        Care EveryWhere ID     This is your Care EveryWhere ID. This could be used by other organizations to access your [facility] medical records  [id number]         Blood Pressure from Last 3 Encounters:   18 (!) 191/105    Weight from Last 3 Encounters:   18 68.5 kg (151 lb)              Today, you had the following     No orders found for display         Today's Medication Changes          These changes are accurate as of 18  3:57 PM.  If you have any questions, ask your nurse or doctor.               These medicines have changed or have updated prescriptions.        Dose/Directions    * brimonidine 0.15 % ophthalmic solution   Commonly known as:  ALPHAGAN-P   This may have changed:  Another medication with the same name was added. Make sure you understand how and when to take each.        Dose:  1 drop   Place 1 drop Into the left eye 3 times daily   Refills:  0       * brimonidine 0.2 % ophthalmic solution   Commonly known as:  ALPHAGAN   This may have changed:  Another medication with the same name was added. Make sure you understand how and when to take each.   Used for:  NVG (neovascular glaucoma), left, stage unspecified        Dose:  1 drop   Place 1 drop Into the left eye 3 times daily   Quantity:  15 mL   Refills:  11       * brimonidine 0.2 % ophthalmic solution   Commonly known as:  ALPHAGAN   This may have changed:  You were already taking a medication with the same name, and this prescription was added. Make sure you understand how and when to take each.    Used for:  NVG (neovascular glaucoma), left, stage unspecified        Dose:  1 drop   Place 1 drop Into the left eye 3 times daily   Quantity:  15 mL   Refills:  11       * dorzolamide-timolol 2-0.5 % ophthalmic solution   Commonly known as:  COSOPT   This may have changed:  Another medication with the same name was added. Make sure you understand how and when to take each.        Dose:  1 drop   Place 1 drop Into the left eye 2 times daily   Refills:  0       * dorzolamide-timolol 2-0.5 % ophthalmic solution   Commonly known as:  COSOPT   This may have changed:  Another medication with the same name was added. Make sure you understand how and when to take each.   Used for:  NVG (neovascular glaucoma), left, stage unspecified        Dose:  1 drop   Place 1 drop Into the left eye 2 times daily   Quantity:  1 Bottle   Refills:  11       * dorzolamide-timolol 2-0.5 % ophthalmic solution   Commonly known as:  COSOPT   This may have changed:  You were already taking a medication with the same name, and this prescription was added. Make sure you understand how and when to take each.   Used for:  NVG (neovascular glaucoma), left, stage unspecified        Dose:  1 drop   Place 1 drop Into the left eye 2 times daily   Quantity:  1 Bottle   Refills:  11       * Notice:  This list has 6 medication(s) that are the same as other medications prescribed for you. Read the directions carefully, and ask your doctor or other care provider to review them with you.         Where to get your medicines      These medications were sent to [facility] [address] [place] [id number]     Phone:  [phone]     brimonidine 0.2 % ophthalmic solution    dorzolamide-timolol 2-0.5 % ophthalmic solution                Primary Care Provider Fax #    Physician No Ref-Primary [phone]       No address on file        Equal Access to Services      ERLINDA BRUNO : Hadii aad ku angy Low, waaxda luqadaha, qaybta kaalmada adechu, lobo mansi luisguillermo gates cristinoenrique brito . So [facility] [phone].    ATENCIÓN: Si vargas moore, tiene a gilman disposición servicios gratuitos de asistencia lingüística. Jasminaame al [phone].    We comply with applicable federal civil rights laws and [place] laws. We do not discriminate on the basis of race, color, national origin, age, disability, sex, sexual orientation, or gender identity.            Thank you!     Thank you for choosing EYE CLINIC  for your care. Our goal is always to provide you with excellent care. Hearing back from our patients is one way we can continue to improve our services. Please take a few minutes to complete the written survey that you may receive in the mail after your visit with us. Thank you!             Your Updated Medication List - Protect others around you: Learn how to safely use, store and throw away your medicines at www.disposemymeds.org.          This list is accurate as of 11/27/18  3:57 PM.  Always use your most recent med list.                   Brand Name Dispense Instructions for use Diagnosis    atropine 1 % ophthalmic solution     1 Bottle    Place 1-2 drops Into the left eye 2 times daily    Post-operative state       * brimonidine 0.15 % ophthalmic solution    ALPHAGAN-P     Place 1 drop Into the left eye 3 times daily        * brimonidine 0.2 % ophthalmic solution    ALPHAGAN    15 mL    Place 1 drop Into the left eye 3 times daily    NVG (neovascular glaucoma), left, stage unspecified       * brimonidine 0.2 % ophthalmic solution    ALPHAGAN    15 mL    Place 1 drop Into the left eye 3 times daily    NVG (neovascular glaucoma), left, stage unspecified       * dorzolamide-timolol 2-0.5 % ophthalmic solution    COSOPT     Place 1 drop Into the left eye 2 times daily        * dorzolamide-timolol 2-0.5 % ophthalmic solution    COSOPT    1 Bottle    Place 1 drop Into the left eye  2 times daily    NVG (neovascular glaucoma), left, stage unspecified       * dorzolamide-timolol 2-0.5 % ophthalmic solution    COSOPT    1 Bottle    Place 1 drop Into the left eye 2 times daily    NVG (neovascular glaucoma), left, stage unspecified       prednisoLONE acetate 1 % ophthalmic suspension    PRED FORTE     Place 1 drop Into the left eye 4 times daily        * Notice:  This list has 6 medication(s) that are the same as other medications prescribed for you. Read the directions carefully, and ask your doctor or other care provider to review them with you.

## 2018-11-27 NOTE — PATIENT INSTRUCTIONS
Patient will continue on Cosopt (Timolol/Dorzolamide) which is a blue top drop 2x/day (12 hours apart) in the LEFT EYE ONLY and Alphagan (Brimonidine) which is a purple top drop 2x/day (12 hours apart) in the LEFT EYE ONLY.  Patient will follow up with Optometry for new glasses prescription.    Patient will return to clinic in 6 months with repeat IOP check and dilated eye exam and repeat discussion about possible surgery in the left eye.

## 2018-11-27 NOTE — NURSING NOTE
Chief Complaints and History of Present Illnesses   Patient presents with     Follow Up For     Pseudophakia of right eye      HPI    Last Eye Exam:  8/28/18   Affected eye(s):  Right   Symptoms:        Duration:  4 months   Frequency:  Constant       Do you have eye pain now?:  No      Comments:  Jesse is here for a follow up of Pseudophakia of right eye He says he uses glasses for reading only and did not bring them along.      Kyle Cordon COT 2:34 PM November 27, 2018

## 2018-11-27 NOTE — PROGRESS NOTES
"1)?NVG OS -- s/p mCPC OS (7/30/18), H/O noncompliance with gtts per outside notes -- K pachy:555/634    Tmax:     HVF:  OD:Fluct    CDR: OD:0.5    HRT/OCT: OD:RNFL WNL    FHX of Glc: No      Gonio:       Intolerant to: Diamox -- fatigue, feeling tired      Asthma/COPD: No   Steroid Use: No    Kidney Stones: No    Sulfa Allergy: No      IOP targets: OS:Comfort  --  2)PCIOL OD c PCO OD -- done in [place]   3)Mature NS OS -- Bscan done previously that reportedly revealed an attached retina  -- IOL calcs done  4)??H/O CRVO OS per old notes s/p Avastin OS at  -- was previously following at  with Dr. Espinoza -- pt reports decrease in vision in April 2014 upon awakening in morning (denies H/O trauma prior to vision loss) -- pt was evaluated in [place] after vision loss and they did not offer cataract surgery OS because it would not improve his vision ... Was told eye was \"full of blood\"  -- pt reports vision OS in 2018 is the same as it was in 2015 -- states that he lost all vision in 2014 -- very guarded visual prognosis    MD:pt needs  -- will call pt to schedule    MD: (11/18) pt now wanting to consider surgery OS -- pt feels vision is better and that he has HM vision in bright sunlight     Patient will continue on Cosopt (Timolol/Dorzolamide) which is a blue top drop 2x/day (12 hours apart) in the LEFT EYE ONLY and Alphagan (Brimonidine) which is a purple top drop 2x/day (12 hours apart) in the LEFT EYE ONLY.  Patient will follow up with Optometry for new glasses prescription.    Patient will return to clinic in 6 months with repeat IOP check and dilated eye exam and repeat discussion about possible surgery in the left eye.           Attending Physician Attestation:  Complete documentation of historical and exam elements from today's encounter can be found in the full encounter summary report (not reduplicated in this progress note). I personally obtained the chief complaint(s) and history of present " illness.  I confirmed and edited as necessary the review of systems, past medical/surgical history, family history, social history, and examination findings as documented by others; and I examined the patient myself. I personally reviewed the relevant tests, images, and reports as documented above. I formulated and edited as necessary the assessment and plan and discussed the findings and management plan with the patient and family.  - Rubi Estrada MD

## 2019-05-28 ENCOUNTER — OFFICE VISIT (OUTPATIENT)
Dept: OPHTHALMOLOGY | Facility: CLINIC | Age: 79
End: 2019-05-28
Attending: OPHTHALMOLOGY
Payer: COMMERCIAL

## 2019-05-28 DIAGNOSIS — H25.22 MORGAGNIAN AGE-RELATED CATARACT OF LEFT EYE: ICD-10-CM

## 2019-05-28 DIAGNOSIS — H40.52X0 NVG (NEOVASCULAR GLAUCOMA), LEFT, STAGE UNSPECIFIED: Primary | ICD-10-CM

## 2019-05-28 PROCEDURE — G0463 HOSPITAL OUTPT CLINIC VISIT: HCPCS | Mod: ZF

## 2019-05-28 RX ORDER — BRIMONIDINE TARTRATE 2 MG/ML
1 SOLUTION/ DROPS OPHTHALMIC 3 TIMES DAILY
Qty: 15 ML | Refills: 11 | Status: SHIPPED | OUTPATIENT
Start: 2019-05-28 | End: 2019-10-04

## 2019-05-28 RX ORDER — ENALAPRIL MALEATE 20 MG/1
20 TABLET ORAL 2 TIMES DAILY
COMMUNITY
End: 2019-12-05

## 2019-05-28 RX ORDER — DORZOLAMIDE HYDROCHLORIDE AND TIMOLOL MALEATE 20; 5 MG/ML; MG/ML
1 SOLUTION/ DROPS OPHTHALMIC 2 TIMES DAILY
Qty: 1 BOTTLE | Refills: 11 | Status: SHIPPED | OUTPATIENT
Start: 2019-05-28 | End: 2019-10-10

## 2019-05-28 ASSESSMENT — PACHYMETRY
OS_CT(UM): 634
EXAM_DATE: 6/26/2018
OD_CT(UM): 555

## 2019-05-28 ASSESSMENT — VISUAL ACUITY
OD_PH_SC: 20/50
OD_SC: 20/60
METHOD: SNELLEN - LINEAR
OD_SC+: -1
OS_SC: LP WITH PROJECTION

## 2019-05-28 ASSESSMENT — CONF VISUAL FIELD
OS_SUPERIOR_TEMPORAL_RESTRICTION: 1
METHOD: COUNTING FINGERS
OD_NORMAL: 1
OS_INFERIOR_TEMPORAL_RESTRICTION: 1
OS_INFERIOR_NASAL_RESTRICTION: 1
OS_SUPERIOR_NASAL_RESTRICTION: 1

## 2019-05-28 ASSESSMENT — SLIT LAMP EXAM - LIDS
COMMENTS: 2+ DERMATOCHALASIS
COMMENTS: 3+ DERMATOCHALASIS

## 2019-05-28 ASSESSMENT — CUP TO DISC RATIO: OD_RATIO: 0.5

## 2019-05-28 ASSESSMENT — TONOMETRY
IOP_METHOD: APPLANATION
OS_IOP_MMHG: 21
OD_IOP_MMHG: 11

## 2019-05-28 NOTE — PROGRESS NOTES
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 Espinoza -- pt reports decrease in vision in April 2014 upon awakening in morning (denies H/O trauma prior to vision loss) -- pt was evaluated in [place] after vision loss and they did not offer cataract surgery OS because it would not improve his vision ... Was told eye was \"full of blood\"  -- pt reports vision OS in 2018 is the same as it was in 2015 -- states that he lost all vision in 2014 -- very guarded visual prognosis    MD:pt needs  -- will call pt to schedule    MD: (11/18) pt now wanting to consider surgery OS -- pt feels vision is better and that he has HM vision in bright sunlight     Patient will continue on Cosopt (Timolol/Dorzolamide) which is a blue top drop 2x/day (12 hours apart) in the LEFT EYE ONLY and Alphagan (Brimonidine) which is a purple top drop 2x/day (12 hours apart) in the LEFT EYE ONLY.  Patient will follow up with retina clinic for B scan and consideration of Avastin injection, carotid doppler  prior to consideration complex cataract, tube surgery, Avastin with PRP laser.    Patient will return to clinic in 1-2 months with repeat IOP check and Ascan IOL calculations for the left eye and repeat discussion about possible surgery in the left eye.           Attending Physician Attestation:  Complete documentation of historical and exam elements from today's encounter can be found in the full " encounter summary report (not reduplicated in this progress note). I personally obtained the chief complaint(s) and history of present illness.  I confirmed and edited as necessary the review of systems, past medical/surgical history, family history, social history, and examination findings as documented by others; and I examined the patient myself. I personally reviewed the relevant tests, images, and reports as documented above. I formulated and edited as necessary the assessment and plan and discussed the findings and management plan with the patient and family.  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

## 2019-05-28 NOTE — PATIENT INSTRUCTIONS
Patient will continue on Cosopt (Timolol/Dorzolamide) which is a blue top drop 2x/day (12 hours apart) in the LEFT EYE ONLY and Alphagan (Brimonidine) which is a purple top drop 2x/day (12 hours apart) in the LEFT EYE ONLY.  Patient will follow up with retina clinic for B scan and consideration of Avastin injection prior to consideration complex cataract, tube surgery with PRP laser.    Patient will return to clinic in 1-2 months with repeat IOP check and Ascan IOL calculations for the left eye and repeat discussion about possible surgery in the left eye.

## 2019-05-28 NOTE — NURSING NOTE
Chief Complaint(s) and History of Present Illness(es)     Glaucoma Follow-Up     In left eye.  Associated symptoms include redness (Some intermittent redness x last few months but gone now. ).  Negative for eye pain, dryness and tearing.              Comments     Pt is here for a 6 month f/u for NVG left eye. Pt notes vision is about the same. Pt notes he is still using his drops as directed: Cosopt (Timolol/Dorzolamide) 2x/day (12 hours apart) in the LEFT EYE ONLY and Alphagan (Brimonidine) 2x/day (12 hours apart) in the LEFT EYE ONLY.     Thais Mejia, COMT 2:05 PM May 28, 2019

## 2019-06-17 DIAGNOSIS — H53.10 SUBJECTIVE VISUAL DISTURBANCE: Primary | ICD-10-CM

## 2019-07-01 ENCOUNTER — OFFICE VISIT (OUTPATIENT)
Dept: OPHTHALMOLOGY | Facility: CLINIC | Age: 79
End: 2019-07-01
Attending: OPHTHALMOLOGY
Payer: COMMERCIAL

## 2019-07-01 DIAGNOSIS — H34.8121 CENTRAL RETINAL VEIN OCCLUSION WITH NEOVASCULARIZATION OF LEFT EYE (H): ICD-10-CM

## 2019-07-01 DIAGNOSIS — H53.10 SUBJECTIVE VISUAL DISTURBANCE: ICD-10-CM

## 2019-07-01 DIAGNOSIS — H25.22 MORGAGNIAN AGE-RELATED CATARACT OF LEFT EYE: ICD-10-CM

## 2019-07-01 DIAGNOSIS — H40.52X0 NVG (NEOVASCULAR GLAUCOMA), LEFT, STAGE UNSPECIFIED: Primary | ICD-10-CM

## 2019-07-01 PROCEDURE — 76512 OPH US DX B-SCAN: CPT | Mod: ZF | Performed by: OPHTHALMOLOGY

## 2019-07-01 PROCEDURE — 76513 OPH US DX ANT SGM US UNI/BI: CPT | Mod: ZF | Performed by: OPHTHALMOLOGY

## 2019-07-01 PROCEDURE — G0463 HOSPITAL OUTPT CLINIC VISIT: HCPCS | Mod: ZF

## 2019-07-01 ASSESSMENT — VISUAL ACUITY
OD_SC: 40-1
METHOD: SNELLEN - LINEAR
OD_PH_SC: 20/30
OD_PH_SC+: +1
OS_SC: LP

## 2019-07-01 ASSESSMENT — EXTERNAL EXAM - LEFT EYE: OS_EXAM: NORMAL

## 2019-07-01 ASSESSMENT — CONF VISUAL FIELD
OS_SUPERIOR_NASAL_RESTRICTION: 1
OS_SUPERIOR_TEMPORAL_RESTRICTION: 1
OS_INFERIOR_NASAL_RESTRICTION: 1
METHOD: COUNTING FINGERS
OD_NORMAL: 1
OS_INFERIOR_TEMPORAL_RESTRICTION: 1

## 2019-07-01 ASSESSMENT — SLIT LAMP EXAM - LIDS
COMMENTS: NORMAL
COMMENTS: NORMAL

## 2019-07-01 ASSESSMENT — TONOMETRY
IOP_METHOD: APPLANATION
OD_IOP_MMHG: 16
OS_IOP_MMHG: 21

## 2019-07-01 ASSESSMENT — EXTERNAL EXAM - RIGHT EYE: OD_EXAM: NORMAL

## 2019-07-01 ASSESSMENT — CUP TO DISC RATIO: OD_RATIO: 0.5

## 2019-07-01 NOTE — NURSING NOTE
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  He states that his vision has seemed stable in both eyes, since his last eye exam.   He has very little vision in the left eye, only light perception.  Patient denies having any eye discomfort.    Jerilyn De Los Santos COT 9:50 AM July 1, 2019

## 2019-07-01 NOTE — PROGRESS NOTES
"CC -   CRVO OS    INTERVAL HISTORY - Initial visit with me.  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  Patient had sudden vision loss in 2014 in [place] upon awakening, no trauma, was told eye was \"full of blood\".  Per patient was told blood vessel broke OS, per records patient was told CE/IOL wouldn't help, but patient states he was offered the surgery in [place] for OS.      Had Avastin x 2 OS at [facility] prior to CPC in 2018      PAST OCULAR SURGERY  CE/IOL OD ([place])  mpCPC OS 7/2018 ([facility])    RETINAL IMAGING:  U/S B-scan  7/1/19  OS - no likely RD, anterior reflective membrane nasal, cannot r/o TRD, anterior reflective structure posterior to CB not likley mass, ?regressed NVE  UBM 7/1/19 shows structure in more detail      ASSESSMENT & PLAN    1.  CRVO OS    - presumed etiology of NVG   - OIS possible but less likely given sudden onset     - onset 4/2014 by history, ischemic   - s/p Avastin x 2 in 2018 (last 6/2018)     - will need PRP if cataract removed   - would advise Avastin 1 week prior to CE/IOL     - will check ffERG to help assess vision potential    2. NVG OS   - s/p CPC 7/2018    - IOP OK   - planned tube with CE/IOL   - advise Avastin 1 week prior      3 NS OS   - precludes posterior view      4. 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  I personally obtained the chief complaint(s) and history of present illness.  I confirmed and edited as necessary the review of systems, past medical/surgical history, family history, social history, and examination findings as documented by " others; and I examined the patient myself.  I personally reviewed the relevant tests, images, and reports as documented above.  I formulated and edited as necessary the assessment and plan and discussed the findings and management plan with the patient and family    Rhianna Ryan MD, PhD  , Vitreoretinal Surgery  Department of Ophthalmology  [facility]

## 2019-07-08 ENCOUNTER — ALLIED HEALTH/NURSE VISIT (OUTPATIENT)
Dept: OPHTHALMOLOGY | Facility: CLINIC | Age: 79
End: 2019-07-08
Attending: OPHTHALMOLOGY
Payer: COMMERCIAL

## 2019-07-08 DIAGNOSIS — H53.10 SUBJECTIVE VISUAL DISTURBANCE: ICD-10-CM

## 2019-07-08 DIAGNOSIS — H25.22 MORGAGNIAN AGE-RELATED CATARACT OF LEFT EYE: ICD-10-CM

## 2019-07-08 PROCEDURE — 40000269 ZZH STATISTIC NO CHARGE FACILITY FEE: Mod: ZF

## 2019-07-08 PROCEDURE — 92273 FULL FIELD ERG W/I&R: CPT | Mod: ZF

## 2019-07-08 ASSESSMENT — VISUAL ACUITY
OD_PH_SC: 20/40
OD_SC: 20/50
OD_PH_SC+: -+
OS_SC: LP
OD_SC+: -3
METHOD: SNELLEN - LINEAR

## 2019-07-08 NOTE — PROGRESS NOTES
"2019    STANDARD ERG REPORT    Referring: Shelby    RE:  Jesse Guerrero  MRN:  [id number]  :  1940    ERG Date:  2019    CLINICAL HISTORY:  The patient is a 78 year old man with dense cataract left eye and presumed old CRVO and NVG, with cataract surgery being contemplated.    DIAGNOSTIC FINDINGS:    A full field ERG was obtained in both eye using DTL electrodes and ISCEV standards.  Per the technician, the recording was essentially reliable in both eyes, though the patient had great difficulty keeping his eyes open due to deeply set eyes with dermatochalasis.     Under scotopic conditions, the right eye showed a normal dim scotopic response but abnormal bright scotopic response.  In particular, the response to the bright white flash had normal A-wave amplitude and implicit time.  However, while the  B-wave amplitude was normal, the implicit time was increased by 5.7 ms above normal.  The left eye response was severely  attenuated, with a dim response that could not be recorded.  The bright scotopic response had severely attenuated A and B waves, and the B-wave implicit time was increased by 5.2 ms.  Oscillatory potentials were normal in the right eye and severely attenuated in the left eye.     Under photopic conditions, the right eye 30 Hz flicker amplitude was mildly reduced but the implicit time was normal.  The single photopic flash response of the right eye had normal morphology, but the B-wave amplitude was mildly reduced and the B-wave implicit time was mildly increased. For the left eye, the photopic response could not be recorded.  The 30Hz flicker is more reliable than the single photopic flash for numerical measurements of the photopic response.  .             Visual Acuity Right Eye : 20/50-3, PH 20/40-+      w/o gls, +IOL    Visual Acuity Left Eye : LP (noted as \"sparkles\" and not light perception)      w/o gls                         ALL AVERAGED  Data for Full-Field ERG " Right Eye   Left Eye    Dark-Adapted Patient Normal Patient   0.01 ERG (aydin) amplitude( v) 168 59.1-302.6 ----   0.01 ERG (aydin) implicit time(ms) 99.8 69.7-108.3 ----   MMMMM      3.0 ERG (combined) a-wave amplitude( v) -101 -207.0 to -59.6 -10   3.0 ERG (combined) a-wave implicit time(ms) 20 14.2-21.4 17.8   3.0 ERG (combined) b-wave amplitude( v) 264 99.6-401.4 19   3.0 ERG (combined) b-wave implicit time(ms) 59.9 30.1-54.2 59.4   MMMMM      3.0 Oscillatory Potentials  + Present reduced    Light-Adapted      3.0 Flicker (30-Hz) amplitude( v) 63 71.8-121.8 ----   3.0 Flicker (30-Hz) implicit time(ms) 30 19.4-34.3 ----         3.0 ERG (cone) a-wave amplitude( v) -23 -50.5 to -17.3 ----   3.0 ERG (cone) a-wave implicit time(ms) 17.8 15.1-19.2 ----   3.0 ERG (cone) b-wave amplitude( v) 63 80.8-168.1 ----   3.0 ERG (cone) b-wave implicit time(ms) 33.8 25.3-33.3 ----          * = manipulated cursors         parentheses = cursors at selected peaks         ---- = residual to non-measurable         xxxx = not tested      INTERPRETATION:        1.  Severely abnormal ffERG left eye.  The ffERG responses are mostly not recordable, excecpt for a severely attenuated bright scotopic flash  2.  Abnormal ffERG right eye.  These abnormalities show a markedly increased bright scotopic flash and mildly reduced photopic responses.    3.  The interpretation for the right eye is unclear.  The patient has no known problems or complaints in the right eye to explain.  The abnormalities may be caused by his difficulty opening his eyes during testing.  4. The results for the left eye suggest severe damage to both the inner and outer retina.  One would expect a CRVO to preferentially affect the inner retina. The patient has a history of NVG in his left eye, and there are some reports that severe glaucoma may cause photoreceptor dysfunction and damage, but the results for this patient are more severe than what the literature would suggest.  In  any case the severe abnormalities suggest the prognosis for vision improvement after cataract surgery may be poor.    Sincerely,    Rhianna Ryan MD, PhD

## 2019-07-08 NOTE — NURSING NOTE
"Returns for ffERG per ERG Referral.  Accompanied by .  Pt is from [place] and states dxd wglaucoma in 1965 (bad headache) at which time he was rxd oral med and Pilocarpine 1%  x6mos.  S/P CE/IOL right eye in [place].  Also states he had a procedure [facility] ~5mos ago where \"fluid was taken out of the left eye\".  [facility] alvarez/Dr. Ryan 07-01; also sees Dr. Estrada ([facility] 05-28).  H/O NVG and dense cataract left eye presumed d/t CRVO.  Planned tube with CE/IOL right eye; ffERG to help assess vision potential.  He doesn't believe drops are helping any but states continued use of both brimonidine (Alphagan) and dorzolamine-timolol (Cosopt), one drop to left eye only twice daily, about 7am before work and about 2pm after work.  Reports no major eye pain or headache in the interval but if he does have headache, he just holds hands tight around the head and lies down; does not use any pain relievers.  May need to r/s RTN to Dr. Ryan 08-02 and RTN to Dr. Estrada 08-20 (off from work on Mon and Wed); will await results.  "

## 2019-08-20 ENCOUNTER — OFFICE VISIT (OUTPATIENT)
Dept: OPHTHALMOLOGY | Facility: CLINIC | Age: 79
End: 2019-08-20
Attending: OPHTHALMOLOGY

## 2019-08-20 DIAGNOSIS — Z96.1 PSEUDOPHAKIA OF RIGHT EYE: ICD-10-CM

## 2019-08-20 DIAGNOSIS — H25.89 TOTAL, MATURE SENILE CATARACT: Primary | ICD-10-CM

## 2019-08-20 DIAGNOSIS — H40.52X0 NVG (NEOVASCULAR GLAUCOMA), LEFT, STAGE UNSPECIFIED: ICD-10-CM

## 2019-08-20 PROBLEM — H40.52X4 NEOVASCULAR GLAUCOMA OF LEFT EYE, INDETERMINATE STAGE: Status: ACTIVE | Noted: 2018-04-27

## 2019-08-20 PROBLEM — H21.1X2: Status: ACTIVE | Noted: 2018-04-20

## 2019-08-20 PROCEDURE — 76519 ECHO EXAM OF EYE: CPT | Mod: ZF | Performed by: OPHTHALMOLOGY

## 2019-08-20 PROCEDURE — G0463 HOSPITAL OUTPT CLINIC VISIT: HCPCS | Mod: ZF

## 2019-08-20 ASSESSMENT — EXTERNAL EXAM - RIGHT EYE: OD_EXAM: NORMAL

## 2019-08-20 ASSESSMENT — VISUAL ACUITY
OS_CC: BARE LP
METHOD: SNELLEN - LINEAR

## 2019-08-20 ASSESSMENT — CONF VISUAL FIELD
OS_SUPERIOR_TEMPORAL_RESTRICTION: 1
OS_SUPERIOR_NASAL_RESTRICTION: 1
OS_INFERIOR_TEMPORAL_RESTRICTION: 1
OS_INFERIOR_NASAL_RESTRICTION: 1

## 2019-08-20 ASSESSMENT — TONOMETRY
OD_IOP_MMHG: 18
OS_IOP_MMHG: 10
IOP_METHOD: APPLANATION

## 2019-08-20 ASSESSMENT — SLIT LAMP EXAM - LIDS
COMMENTS: NORMAL
COMMENTS: NORMAL

## 2019-08-20 ASSESSMENT — EXTERNAL EXAM - LEFT EYE: OS_EXAM: NORMAL

## 2019-08-20 NOTE — PATIENT INSTRUCTIONS
Patient will continue on Cosopt (Timolol/Dorzolamide) which is a blue top drop 2x/day (12 hours apart) in the LEFT EYE ONLY and Alphagan (Brimonidine) which is a purple top drop 2x/day (12 hours apart) in the LEFT EYE ONLY.  Patient will follow up with retina clinic for Avastin injection prior to surgery and for PRP laser after the surgery.    A long discussion of the risks, benefits, and alternatives including potential treatment and management options were had with patient and a decision was made to proceed with complex cataract and tube surgery in the left eye.    Patient is very aware of the risk for minimally improved vision but adamantly wants to proceed with surgery in the left eye even if it results in the need for eye removal in the future due to complications.

## 2019-08-20 NOTE — PROGRESS NOTES
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 Espinoza -- pt reports decrease in vision in April 2014 upon awakening in morning (denies H/O trauma prior to vision loss) -- pt was evaluated in [place] after vision loss and they did not offer cataract surgery OS because it would not improve his vision ... Was told eye was \"full of blood\"  -- pt reports vision OS in 2018 is the same as it was in 2015 -- states that he lost all vision in 2014 -- very guarded visual prognosis    MD:pt needs  -- will call pt to schedule    MD: (11/18) pt now wanting to consider surgery OS -- pt feels vision is better and that he has HM vision in bright sunlight  -- B scan done on OS (7/2019) that did not reveal RD and an ERG that was markedly abnormal -- poor visual potential based on ERG testing    MD:pt saw Dr. Muñoz -- rec Avastin 1 week prior to CE/tube    MD: +NVI, poorly dilating pupil, will require Trypan, consider combo with retina -- LP confirmed by MD    Patient will continue on Cosopt (Timolol/Dorzolamide) which is a blue top drop 2x/day (12 hours apart) in the LEFT EYE ONLY and Alphagan (Brimonidine) which is a purple top drop 2x/day (12 hours apart) in the LEFT EYE ONLY.  Patient will follow up with retina clinic for Avastin injection prior to surgery and for PRP laser after the surgery.    A long discussion of the risks, benefits, and alternatives including potential treatment " and management options were had with patient and a decision was made to proceed with complex cataract and tube surgery in the left eye.    Patient is very aware of the risk for minimally improved vision but adamantly wants to proceed with surgery in the left eye even if it results in the need for eye removal in the future due to complications.      I spent a total of 25 minutes with the patient of which greater than 50% of this time was spent counseling treatment and management decisions.          Attending Physician Attestation:  Complete documentation of historical and exam elements from today's encounter can be found in the full encounter summary report (not reduplicated in this progress note). I personally obtained the chief complaint(s) and history of present illness.  I confirmed and edited as necessary the review of systems, past medical/surgical history, family history, social history, and examination findings as documented by others; and I examined the patient myself. I personally reviewed the relevant tests, images, and reports as documented above. I formulated and edited as necessary the assessment and plan and discussed the findings and management plan with the patient and family.  - Rubi Estrada MD

## 2019-08-26 DIAGNOSIS — H53.10 SUBJECTIVE VISUAL DISTURBANCE: Primary | ICD-10-CM

## 2019-08-27 ENCOUNTER — OFFICE VISIT (OUTPATIENT)
Dept: OPHTHALMOLOGY | Facility: CLINIC | Age: 79
End: 2019-08-27
Attending: OPHTHALMOLOGY

## 2019-08-27 DIAGNOSIS — H25.22 MORGAGNIAN AGE-RELATED CATARACT OF LEFT EYE: ICD-10-CM

## 2019-08-27 DIAGNOSIS — H40.52X0 NVG (NEOVASCULAR GLAUCOMA), LEFT, STAGE UNSPECIFIED: Primary | ICD-10-CM

## 2019-08-27 DIAGNOSIS — Z96.1 PSEUDOPHAKIA OF RIGHT EYE: ICD-10-CM

## 2019-08-27 DIAGNOSIS — H53.10 SUBJECTIVE VISUAL DISTURBANCE: ICD-10-CM

## 2019-08-27 DIAGNOSIS — H34.8121 CENTRAL RETINAL VEIN OCCLUSION WITH NEOVASCULARIZATION OF LEFT EYE (H): ICD-10-CM

## 2019-08-27 PROCEDURE — G0463 HOSPITAL OUTPT CLINIC VISIT: HCPCS | Mod: ZF

## 2019-08-27 PROCEDURE — 92134 CPTRZ OPH DX IMG PST SGM RTA: CPT | Mod: ZF | Performed by: OPHTHALMOLOGY

## 2019-08-27 ASSESSMENT — CONF VISUAL FIELD
OS_INFERIOR_NASAL_RESTRICTION: 1
OS_SUPERIOR_NASAL_RESTRICTION: 1
METHOD: COUNTING FINGERS
OS_INFERIOR_TEMPORAL_RESTRICTION: 1
OD_NORMAL: 1
OS_SUPERIOR_TEMPORAL_RESTRICTION: 1

## 2019-08-27 ASSESSMENT — VISUAL ACUITY
METHOD: SNELLEN - LINEAR
OD_SC+: -1
OD_SC: 20/40
OD_PH_SC+: +2
OD_PH_SC: 20/30-1
OS_SC: LP

## 2019-08-27 ASSESSMENT — REFRACTION_WEARINGRX
OD_AXIS: 030
OS_SPHERE: BALANCE
OD_CYLINDER: +0.50
OD_ADD: +3.50
OD_SPHERE: -0.50

## 2019-08-27 ASSESSMENT — TONOMETRY
OS_IOP_MMHG: 10
OD_IOP_MMHG: 16
IOP_METHOD: TONOPEN

## 2019-08-27 ASSESSMENT — CUP TO DISC RATIO: OD_RATIO: 0.5

## 2019-08-27 ASSESSMENT — EXTERNAL EXAM - RIGHT EYE: OD_EXAM: NORMAL

## 2019-08-27 ASSESSMENT — SLIT LAMP EXAM - LIDS
COMMENTS: NORMAL
COMMENTS: NORMAL

## 2019-08-27 ASSESSMENT — EXTERNAL EXAM - LEFT EYE: OS_EXAM: NORMAL

## 2019-08-27 NOTE — PROGRESS NOTES
"CC -   CRVO OS    INTERVAL HISTORY - VA stable each eye.     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  Patient had sudden vision loss in 2014 in [place] upon awakening, no trauma, was told eye was \"full of blood\".  Per patient was told blood vessel broke OS, per records patient was told CE/IOL wouldn't help, but patient states he was offered the surgery in [place] for OS.      Had Avastin x 2 OS at [facility] prior to CPC in 2018      PAST OCULAR SURGERY  CE/IOL OD ([place])  mpCPC OS 7/2018 ([facility])    RETINAL IMAGING:  U/S B-scan  7/1/19  OS - no likely RD, anterior reflective membrane nasal, cannot r/o TRD, anterior reflective structure posterior to CB not likley mass, ?regressed NVE  UBM 7/1/19 shows structure in more detail    OCT 8-27-19  right eye sl abnormal appearing photoreceptors, tr IRF nasal macula, otherwise normal retinal layers    ffERG 7/8/19  OD - mild irregularities  OS - severe attenuation        ASSESSMENT & PLAN    1.  CRVO OS    - presumed etiology of NVG   - OIS possible but less likely given sudden onset     - onset 4/2014 by history, ischemic   - s/p Avastin x 2 in 2018 (last 6/2018)     - ffERG shows OS both inner and outer retinal dysfunction.   - suspect poor prognosis for visual improvement after CEIOL     - planned CE/IOL OS 9/25/19 ([facility])   - advise PRP afterwards   - would advise Avastin 1 week prior to CE/IOL d/t NVG     - d/w patient ffERG results and poor prognosis for vision improvement          2. NVG OS   - s/p CPC 7/2018    - IOP OK   - planned tube with CE/IOL, see #1        3. NS OS   - precludes posterior view   - planned CE/IOL, see #1      4. 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 Pseudophakia OD   - VA good    - Observe      return to clinic: 3 weeks for intravitreal Avastin OS    Miguel Demarco MD - PGY 3  Ophthalmology " resident      ATTESTATION     Attending Attestation:     Complete documentation of historical and exam elements from today's encounter can be found in the full encounter summary report (not reduplicated in this progress note).  I personally obtained the chief complaint(s) and history of present illness.  I confirmed and edited as necessary the review of systems, past medical/surgical history, family history, social history, and examination findings as documented by others; and I examined the patient myself.  I personally reviewed the relevant tests, images, and reports as documented above.  I personally reviewed the ophthalmic test(s) associated with this encounter, agree with the interpretation(s) as documented by the resident/fellow, and have edited the corresponding report(s) as necessary.   I formulated and edited as necessary the assessment and plan and discussed the findings and management plan with the patient and family    Rhianna Ryan MD, PhD  , Vitreoretinal Surgery  Department of Ophthalmology  [facility]

## 2019-08-27 NOTE — NURSING NOTE
Chief Complaints and History of Present Illnesses   Patient presents with     Follow Up     8 week follow-up CRVO OS     Chief Complaint(s) and History of Present Illness(es)     Follow Up     Comments: 8 week follow-up CRVO OS              Comments     Pt denies any significant vision changes in either eye since last visit.  Hx of floaters- unchanged.  Denies any flashes, pain, pressure, irritation, discharge, and tearing.    Kristi Velasco OT 2:59 PM August 27, 2019

## 2019-09-16 DIAGNOSIS — H21.1X2 RUBEOSIS IRIDIS OF LEFT EYE: Primary | ICD-10-CM

## 2019-09-19 ENCOUNTER — PRE VISIT (OUTPATIENT)
Dept: SURGERY | Facility: CLINIC | Age: 79
End: 2019-09-19

## 2019-09-19 NOTE — TELEPHONE ENCOUNTER
FUTURE VISIT INFORMATION      SURGERY INFORMATION:    Date: 10/9/19    Location: [facility]    Surgeon:  Rubi Estrada    Anesthesia Type:  Combined MAC with Retrobulbar    RECORDS REQUESTED FROM:       Pertinent Medical History: hypertension

## 2019-09-30 DIAGNOSIS — H40.52X0 NVG (NEOVASCULAR GLAUCOMA), LEFT, STAGE UNSPECIFIED: Primary | ICD-10-CM

## 2019-10-04 ENCOUNTER — OFFICE VISIT (OUTPATIENT)
Dept: SURGERY | Facility: CLINIC | Age: 79
End: 2019-10-04
Payer: COMMERCIAL

## 2019-10-04 ENCOUNTER — OFFICE VISIT (OUTPATIENT)
Dept: OPHTHALMOLOGY | Facility: CLINIC | Age: 79
End: 2019-10-04
Attending: OPHTHALMOLOGY
Payer: COMMERCIAL

## 2019-10-04 ENCOUNTER — ANESTHESIA EVENT (OUTPATIENT)
Dept: SURGERY | Facility: AMBULATORY SURGERY CENTER | Age: 79
End: 2019-10-04

## 2019-10-04 VITALS
DIASTOLIC BLOOD PRESSURE: 76 MMHG | SYSTOLIC BLOOD PRESSURE: 176 MMHG | HEART RATE: 77 BPM | OXYGEN SATURATION: 95 % | WEIGHT: 142 LBS | RESPIRATION RATE: 16 BRPM | BODY MASS INDEX: 21.52 KG/M2 | HEIGHT: 68 IN | TEMPERATURE: 97.7 F

## 2019-10-04 DIAGNOSIS — Z01.818 PREOP EXAMINATION: ICD-10-CM

## 2019-10-04 DIAGNOSIS — H40.52X0 NVG (NEOVASCULAR GLAUCOMA), LEFT, STAGE UNSPECIFIED: Primary | ICD-10-CM

## 2019-10-04 DIAGNOSIS — H26.9 CATARACT OF LEFT EYE, UNSPECIFIED CATARACT TYPE: ICD-10-CM

## 2019-10-04 DIAGNOSIS — H40.9 GLAUCOMA OF LEFT EYE, UNSPECIFIED GLAUCOMA TYPE: ICD-10-CM

## 2019-10-04 DIAGNOSIS — Z01.818 PREOP EXAMINATION: Primary | ICD-10-CM

## 2019-10-04 LAB
CREAT SERPL-MCNC: 1.01 MG/DL (ref 0.66–1.25)
GFR SERPL CREATININE-BSD FRML MDRD: 71 ML/MIN/{1.73_M2}
HGB BLD-MCNC: 14.8 G/DL (ref 13.3–17.7)
POTASSIUM SERPL-SCNC: 4.4 MMOL/L (ref 3.4–5.3)

## 2019-10-04 PROCEDURE — 67028 INJECTION EYE DRUG: CPT | Mod: LT,ZF | Performed by: OPHTHALMOLOGY

## 2019-10-04 PROCEDURE — 40000269 ZZH STATISTIC NO CHARGE FACILITY FEE: Mod: ZF

## 2019-10-04 PROCEDURE — C9257 BEVACIZUMAB INJECTION: HCPCS | Mod: ZF | Performed by: OPHTHALMOLOGY

## 2019-10-04 PROCEDURE — 25000128 H RX IP 250 OP 636: Mod: ZF | Performed by: OPHTHALMOLOGY

## 2019-10-04 RX ADMIN — Medication 1.25 MG: at 08:44

## 2019-10-04 ASSESSMENT — VISUAL ACUITY
OD_SC+: +2
OD_SC: 20/40
OS_SC: LP
METHOD: SNELLEN - LINEAR

## 2019-10-04 ASSESSMENT — TONOMETRY
IOP_METHOD: ICARE
OD_IOP_MMHG: 19
OS_IOP_MMHG: 17

## 2019-10-04 ASSESSMENT — EXTERNAL EXAM - LEFT EYE: OS_EXAM: NORMAL

## 2019-10-04 ASSESSMENT — LIFESTYLE VARIABLES: TOBACCO_USE: 1

## 2019-10-04 ASSESSMENT — MIFFLIN-ST. JEOR: SCORE: 1348.61

## 2019-10-04 ASSESSMENT — EXTERNAL EXAM - RIGHT EYE: OD_EXAM: NORMAL

## 2019-10-04 ASSESSMENT — SLIT LAMP EXAM - LIDS
COMMENTS: NORMAL
COMMENTS: NORMAL
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
## 2019-10-04 NOTE — NURSING NOTE
"Chief Complaints and History of Present Illnesses   Patient presents with     Follow Up     Chief Complaint(s) and History of Present Illness(es)     Follow Up     Laterality: left eye    Course: stable    Pain scale: 0/10              Comments     3wk Avastin injection only LE    Vision \"good,\" no additional comments or concerns.     Anamika Son COT 8:17 AM October 4, 2019                   "

## 2019-10-04 NOTE — PROGRESS NOTES
"CC -   CRVO OS    INTERVAL HISTORY - VA stable each eye.  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  Patient had sudden vision loss in 2014 in [place] upon awakening, no trauma, was told eye was \"full of blood\".  Per patient was told blood vessel broke OS, per records patient was told CE/IOL wouldn't help, but patient states he was offered the surgery in [place] for OS.      Had Avastin x 2 OS at [facility] prior to CPC in 2018      PAST OCULAR SURGERY  CE/IOL OD ([place])  mpCPC OS 7/2018 ([facility])    RETINAL IMAGING:  U/S B-scan  7/1/19  OS - no likely RD, anterior reflective membrane nasal, cannot r/o TRD, anterior reflective structure posterior to CB not likley mass, ?regressed NVE  UBM 7/1/19 shows structure in more detail    OCT 8-27-19  right eye sl abnormal appearing photoreceptors, tr IRF nasal macula, otherwise normal retinal layers    ffERG 7/8/19  OD - mild irregularities  OS - severe attenuation        ASSESSMENT & PLAN    1.  CRVO OS    - presumed etiology of NVG   - OIS possible but less likely given sudden onset     - onset 4/2014 by history, ischemic   - s/p Avastin x 2 in 2018 (last 6/2018)     - ffERG shows OS both inner and outer retinal dysfunction.   - suspect poor prognosis for visual improvement after CEIOL     - planned CE/IOL OS 10/4/19 ([place])   - advise PRP afterwards   - Avastin today d/t presumed ischemia and risk for NVI after CE/IOL     - r/b/a IVT anti-VEGF d/w patient   - infection, blindness, need for surgery   - off-label use of Avastin   - resident/fellow       - d/w patient ffERG results and poor prognosis for vision improvement          2. NVG OS   - s/p CPC 7/2018    - IOP OK   - planned tube with CE/IOL, see #1        3. NS OS   - precludes posterior view   - planned CE/IOL, see #1      4. Reflective membrane OS   - seen on U/S   - " suspect regressed NVE, possible localized TRD    5. Pseudophakia OD   - VA good    - Observe      return to clinic: 1 month for DFE possible PRP, FA OU transits OS        ATTESTATION     Attending Attestation:     Complete documentation of historical and exam elements from today's encounter can be found in the full encounter summary report (not reduplicated in this progress note).  I personally obtained the chief complaint(s) and history of present illness.  I confirmed and edited as necessary the review of systems, past medical/surgical history, family history, social history, and examination findings as documented by others; and I examined the patient myself.  I personally reviewed the relevant tests, images, and reports as documented above.  I personally reviewed the ophthalmic test(s) associated with this encounter, agree with the interpretation(s) as documented by the resident/fellow, and have edited the corresponding report(s) as necessary.   I formulated and edited as necessary the assessment and plan and discussed the findings and management plan with the patient and family and I was present for the entire procedure performed by the resident/fellow.    Rhianna Ryan MD, PhD  , Vitreoretinal Surgery  Department of Ophthalmology  [facility]

## 2019-10-04 NOTE — PATIENT INSTRUCTIONS
Preparing for Your Surgery      Name:  Jesse Guerrero   MRN:  [id number]   :  1940   Today's Date:  10/4/2019     Arriving for surgery:  Surgery date:  10/9/19  Arrival time:  7:00 am  Please come to:     [facility]  [address] [place] [id number]     Parking is available in front of the Clinics and Surgery Center building from 5:30AM to 8:00PM.  -  Proceed to the 5th floor to check into the Ambulatory Surgery Center.              >> There will be patient concierges on the 1st and 5th floor, for assistance or an escort, if you would like.              >> Please call [phone] with any questions.    What can I eat or drink?  -  You may have solid food or milk products until 8 hours prior to your surgery (midnight).  -  You may have water, apple juice or 7up/Sprite until 2 hours prior to your surgery (6:30 am).    Which medicines can I take?  Stop Aspirin, vitamins and supplements one week prior to surgery.  Hold Ibuprofen for 24 hours and/or Naproxen for 48 hours prior to surgery.   -  Do NOT take these medications in the morning, the day of surgery:  Enalapril (Vasotec)    -  Please take these medications the day of surgery:  Dorzolamide-Timolol (Cosopt) eye drops    How do I prepare myself?  -  Take two showers: one the night before surgery; and one the morning of surgery.         Use Scrubcare or Hibiclens to wash from neck down, leave soap on your skin for up to one minute.  Do not get soap in your eyes or ears.  You may use your own shampoo and conditioner; no other hair products.   -  Do NOT use lotion, powder, deodorant, or antiperspirant the day of your surgery.  -  Do NOT wear any jewelry.  -  Do not bring your own medications to the hospital, except for eye drops.  -  Bring your ID and insurance card.    -If you are scheduled to go home the Same Day as surgery you must have a responsible adult as a  and to stay with you overnight the first  24 hours after surgery.     Questions or Concerns    -If you are scheduled at the Ambulatory Surgery Center and have questions or concerns regarding the day of surgery please call [phone].    -For questions after surgery please call your surgeons office.     AFTER YOUR SURGERY  Breathing exercises   Breathing exercises help you recover faster. Take deep breaths and let the air out slowly. This will:     Help you wake up after surgery.    Help prevent complications like pneumonia.  Preventing complications will help you go home sooner.   We may give you a breathing device (incentive spirometer) to encourage you to breathe deeply.   Nausea and vomiting   You may feel sick to your stomach after surgery; if so, let your nurse know.    Pain control:  After surgery, you may have pain. Our goal is to help you manage your pain. Pain medicine will help you feel comfortable enough to do activities that will help you heal.  These activities may include breathing exercises, walking and physical therapy.   To help your health care team treat your pain we will ask: 1) If you have pain  2) where it is located 3) describe your pain in your words  Methods of pain control include medications given by mouth, vein or by nerve block for some surgeries.  Sequential Compression Device (SCD) or Pneumo Boots:  You may need to wear SCD S on your legs or feet. These are wraps connected to a machine that pumps in air and releases it. The repeated pumping helps prevent blood clots from forming.

## 2019-10-09 ENCOUNTER — OFFICE VISIT (OUTPATIENT)
Dept: INTERPRETER SERVICES | Facility: CLINIC | Age: 79
End: 2019-10-09

## 2019-10-09 ENCOUNTER — ANESTHESIA (OUTPATIENT)
Dept: SURGERY | Facility: AMBULATORY SURGERY CENTER | Age: 79
End: 2019-10-09

## 2019-10-09 ENCOUNTER — ANESTHESIA EVENT (OUTPATIENT)
Dept: SURGERY | Facility: AMBULATORY SURGERY CENTER | Age: 79
End: 2019-10-09

## 2019-10-09 ENCOUNTER — HOSPITAL ENCOUNTER (OUTPATIENT)
Facility: AMBULATORY SURGERY CENTER | Age: 79
End: 2019-10-09
Attending: OPHTHALMOLOGY
Payer: COMMERCIAL

## 2019-10-09 VITALS
TEMPERATURE: 98.2 F | SYSTOLIC BLOOD PRESSURE: 160 MMHG | DIASTOLIC BLOOD PRESSURE: 78 MMHG | HEART RATE: 69 BPM | RESPIRATION RATE: 14 BRPM | OXYGEN SATURATION: 98 %

## 2019-10-09 DIAGNOSIS — H40.52X4 NEOVASCULAR GLAUCOMA OF LEFT EYE, INDETERMINATE STAGE: ICD-10-CM

## 2019-10-09 DIAGNOSIS — H25.89 TOTAL, MATURE SENILE CATARACT: Primary | ICD-10-CM

## 2019-10-09 DEVICE — EYE IMP GRAFT VISIONGRAFT CORNEA 1/2MOON 9MM CO301AL-90: Type: IMPLANTABLE DEVICE | Site: EYE | Status: FUNCTIONAL

## 2019-10-09 DEVICE — EYE IMP VALVE AHMED FLEXIBLE FP7: Type: IMPLANTABLE DEVICE | Site: EYE | Status: FUNCTIONAL

## 2019-10-09 RX ORDER — FLURBIPROFEN SODIUM 0.3 MG/ML
1 SOLUTION/ DROPS OPHTHALMIC
Status: COMPLETED | OUTPATIENT
Start: 2019-10-09 | End: 2019-10-09

## 2019-10-09 RX ORDER — FENTANYL CITRATE 50 UG/ML
25-50 INJECTION, SOLUTION INTRAMUSCULAR; INTRAVENOUS
Status: DISCONTINUED | OUTPATIENT
Start: 2019-10-09 | End: 2019-10-10 | Stop reason: HOSPADM

## 2019-10-09 RX ORDER — FENTANYL CITRATE 50 UG/ML
25-50 INJECTION, SOLUTION INTRAMUSCULAR; INTRAVENOUS
Status: DISCONTINUED | OUTPATIENT
Start: 2019-10-09 | End: 2019-10-09 | Stop reason: HOSPADM

## 2019-10-09 RX ORDER — HYDROMORPHONE HYDROCHLORIDE 1 MG/ML
.3-.5 INJECTION, SOLUTION INTRAMUSCULAR; INTRAVENOUS; SUBCUTANEOUS EVERY 10 MIN PRN
Status: DISCONTINUED | OUTPATIENT
Start: 2019-10-09 | End: 2019-10-10 | Stop reason: HOSPADM

## 2019-10-09 RX ORDER — DEXAMETHASONE SODIUM PHOSPHATE 4 MG/ML
INJECTION, SOLUTION INTRA-ARTICULAR; INTRALESIONAL; INTRAMUSCULAR; INTRAVENOUS; SOFT TISSUE PRN
Status: DISCONTINUED | OUTPATIENT
Start: 2019-10-09 | End: 2019-10-09 | Stop reason: HOSPADM

## 2019-10-09 RX ORDER — LIDOCAINE 40 MG/G
CREAM TOPICAL
Status: DISCONTINUED | OUTPATIENT
Start: 2019-10-09 | End: 2019-10-09 | Stop reason: HOSPADM

## 2019-10-09 RX ORDER — OXYCODONE HYDROCHLORIDE 5 MG/1
5 TABLET ORAL EVERY 4 HOURS PRN
Status: DISCONTINUED | OUTPATIENT
Start: 2019-10-09 | End: 2019-10-10 | Stop reason: HOSPADM

## 2019-10-09 RX ORDER — SODIUM CHLORIDE, SODIUM LACTATE, POTASSIUM CHLORIDE, CALCIUM CHLORIDE 600; 310; 30; 20 MG/100ML; MG/100ML; MG/100ML; MG/100ML
INJECTION, SOLUTION INTRAVENOUS CONTINUOUS
Status: DISCONTINUED | OUTPATIENT
Start: 2019-10-09 | End: 2019-10-10 | Stop reason: HOSPADM

## 2019-10-09 RX ORDER — ACETAMINOPHEN 325 MG/1
975 TABLET ORAL ONCE
Status: DISCONTINUED | OUTPATIENT
Start: 2019-10-09 | End: 2019-10-09 | Stop reason: HOSPADM

## 2019-10-09 RX ORDER — NALOXONE HYDROCHLORIDE 0.4 MG/ML
.1-.4 INJECTION, SOLUTION INTRAMUSCULAR; INTRAVENOUS; SUBCUTANEOUS
Status: DISCONTINUED | OUTPATIENT
Start: 2019-10-09 | End: 2019-10-10 | Stop reason: HOSPADM

## 2019-10-09 RX ORDER — PROPOFOL 10 MG/ML
INJECTION, EMULSION INTRAVENOUS PRN
Status: DISCONTINUED | OUTPATIENT
Start: 2019-10-09 | End: 2019-10-09

## 2019-10-09 RX ORDER — OFLOXACIN 3 MG/ML
1 SOLUTION/ DROPS OPHTHALMIC
Status: COMPLETED | OUTPATIENT
Start: 2019-10-09 | End: 2019-10-09

## 2019-10-09 RX ORDER — NEOMYCIN SULFATE, POLYMYXIN B SULFATE, AND DEXAMETHASONE 3.5; 10000; 1 MG/G; [USP'U]/G; MG/G
OINTMENT OPHTHALMIC PRN
Status: DISCONTINUED | OUTPATIENT
Start: 2019-10-09 | End: 2019-10-09 | Stop reason: HOSPADM

## 2019-10-09 RX ORDER — PROPARACAINE HYDROCHLORIDE 5 MG/ML
1 SOLUTION/ DROPS OPHTHALMIC ONCE
Status: COMPLETED | OUTPATIENT
Start: 2019-10-09 | End: 2019-10-09

## 2019-10-09 RX ORDER — CIPROFLOXACIN HYDROCHLORIDE 3.5 MG/ML
1 SOLUTION/ DROPS TOPICAL 4 TIMES DAILY
Qty: 1 BOTTLE | Refills: 0 | Status: SHIPPED | OUTPATIENT
Start: 2019-10-09 | End: 2019-12-05

## 2019-10-09 RX ORDER — ATROPINE SULFATE 10 MG/ML
SOLUTION/ DROPS OPHTHALMIC PRN
Status: DISCONTINUED | OUTPATIENT
Start: 2019-10-09 | End: 2019-10-09 | Stop reason: HOSPADM

## 2019-10-09 RX ORDER — TROPICAMIDE 10 MG/ML
1 SOLUTION/ DROPS OPHTHALMIC
Status: COMPLETED | OUTPATIENT
Start: 2019-10-09 | End: 2019-10-09

## 2019-10-09 RX ORDER — ONDANSETRON 2 MG/ML
4 INJECTION INTRAMUSCULAR; INTRAVENOUS EVERY 30 MIN PRN
Status: DISCONTINUED | OUTPATIENT
Start: 2019-10-09 | End: 2019-10-10 | Stop reason: HOSPADM

## 2019-10-09 RX ORDER — PREDNISOLONE ACETATE 10 MG/ML
1 SUSPENSION/ DROPS OPHTHALMIC
Qty: 15 ML | Refills: 3 | Status: SHIPPED | OUTPATIENT
Start: 2019-10-09 | End: 2019-12-05

## 2019-10-09 RX ORDER — SODIUM CHLORIDE, SODIUM LACTATE, POTASSIUM CHLORIDE, CALCIUM CHLORIDE 600; 310; 30; 20 MG/100ML; MG/100ML; MG/100ML; MG/100ML
INJECTION, SOLUTION INTRAVENOUS CONTINUOUS PRN
Status: DISCONTINUED | OUTPATIENT
Start: 2019-10-09 | End: 2019-10-09

## 2019-10-09 RX ORDER — FENTANYL CITRATE 50 UG/ML
INJECTION, SOLUTION INTRAMUSCULAR; INTRAVENOUS PRN
Status: DISCONTINUED | OUTPATIENT
Start: 2019-10-09 | End: 2019-10-09

## 2019-10-09 RX ORDER — ONDANSETRON 4 MG/1
4 TABLET, ORALLY DISINTEGRATING ORAL EVERY 30 MIN PRN
Status: DISCONTINUED | OUTPATIENT
Start: 2019-10-09 | End: 2019-10-10 | Stop reason: HOSPADM

## 2019-10-09 RX ORDER — BALANCED SALT SOLUTION 6.4; .75; .48; .3; 3.9; 1.7 MG/ML; MG/ML; MG/ML; MG/ML; MG/ML; MG/ML
SOLUTION OPHTHALMIC PRN
Status: DISCONTINUED | OUTPATIENT
Start: 2019-10-09 | End: 2019-10-09 | Stop reason: HOSPADM

## 2019-10-09 RX ORDER — MEPERIDINE HYDROCHLORIDE 25 MG/ML
12.5 INJECTION INTRAMUSCULAR; INTRAVENOUS; SUBCUTANEOUS
Status: DISCONTINUED | OUTPATIENT
Start: 2019-10-09 | End: 2019-10-10 | Stop reason: HOSPADM

## 2019-10-09 RX ORDER — CYCLOPENTOLATE HYDROCHLORIDE 10 MG/ML
1 SOLUTION/ DROPS OPHTHALMIC
Status: COMPLETED | OUTPATIENT
Start: 2019-10-09 | End: 2019-10-09

## 2019-10-09 RX ORDER — LIDOCAINE HYDROCHLORIDE 20 MG/ML
INJECTION, SOLUTION INFILTRATION; PERINEURAL PRN
Status: DISCONTINUED | OUTPATIENT
Start: 2019-10-09 | End: 2019-10-09

## 2019-10-09 RX ORDER — PHENYLEPHRINE HYDROCHLORIDE 25 MG/ML
1 SOLUTION/ DROPS OPHTHALMIC
Status: COMPLETED | OUTPATIENT
Start: 2019-10-09 | End: 2019-10-09

## 2019-10-09 RX ORDER — ONDANSETRON 2 MG/ML
INJECTION INTRAMUSCULAR; INTRAVENOUS PRN
Status: DISCONTINUED | OUTPATIENT
Start: 2019-10-09 | End: 2019-10-09

## 2019-10-09 RX ORDER — SODIUM CHLORIDE, SODIUM LACTATE, POTASSIUM CHLORIDE, CALCIUM CHLORIDE 600; 310; 30; 20 MG/100ML; MG/100ML; MG/100ML; MG/100ML
INJECTION, SOLUTION INTRAVENOUS CONTINUOUS
Status: DISCONTINUED | OUTPATIENT
Start: 2019-10-09 | End: 2019-10-09 | Stop reason: HOSPADM

## 2019-10-09 RX ADMIN — LIDOCAINE HYDROCHLORIDE 40 MG: 20 INJECTION, SOLUTION INFILTRATION; PERINEURAL at 08:43

## 2019-10-09 RX ADMIN — PROPOFOL 20 MG: 10 INJECTION, EMULSION INTRAVENOUS at 08:43

## 2019-10-09 RX ADMIN — OFLOXACIN 1 DROP: 3 SOLUTION/ DROPS OPHTHALMIC at 07:35

## 2019-10-09 RX ADMIN — PHENYLEPHRINE HYDROCHLORIDE 1 DROP: 25 SOLUTION/ DROPS OPHTHALMIC at 07:53

## 2019-10-09 RX ADMIN — FENTANYL CITRATE 50 MCG: 50 INJECTION, SOLUTION INTRAMUSCULAR; INTRAVENOUS at 08:40

## 2019-10-09 RX ADMIN — OFLOXACIN 1 DROP: 3 SOLUTION/ DROPS OPHTHALMIC at 07:43

## 2019-10-09 RX ADMIN — CYCLOPENTOLATE HYDROCHLORIDE 1 DROP: 10 SOLUTION/ DROPS OPHTHALMIC at 07:35

## 2019-10-09 RX ADMIN — FLURBIPROFEN SODIUM 1 DROP: 0.3 SOLUTION/ DROPS OPHTHALMIC at 07:53

## 2019-10-09 RX ADMIN — TROPICAMIDE 1 DROP: 10 SOLUTION/ DROPS OPHTHALMIC at 07:53

## 2019-10-09 RX ADMIN — ONDANSETRON 4 MG: 2 INJECTION INTRAMUSCULAR; INTRAVENOUS at 08:46

## 2019-10-09 RX ADMIN — CYCLOPENTOLATE HYDROCHLORIDE 1 DROP: 10 SOLUTION/ DROPS OPHTHALMIC at 07:53

## 2019-10-09 RX ADMIN — SODIUM CHLORIDE, SODIUM LACTATE, POTASSIUM CHLORIDE, CALCIUM CHLORIDE: 600; 310; 30; 20 INJECTION, SOLUTION INTRAVENOUS at 13:00

## 2019-10-09 RX ADMIN — PHENYLEPHRINE HYDROCHLORIDE 1 DROP: 25 SOLUTION/ DROPS OPHTHALMIC at 07:43

## 2019-10-09 RX ADMIN — CYCLOPENTOLATE HYDROCHLORIDE 1 DROP: 10 SOLUTION/ DROPS OPHTHALMIC at 07:43

## 2019-10-09 RX ADMIN — SODIUM CHLORIDE, SODIUM LACTATE, POTASSIUM CHLORIDE, CALCIUM CHLORIDE: 600; 310; 30; 20 INJECTION, SOLUTION INTRAVENOUS at 08:36

## 2019-10-09 RX ADMIN — FENTANYL CITRATE 50 MCG: 50 INJECTION, SOLUTION INTRAMUSCULAR; INTRAVENOUS at 13:44

## 2019-10-09 RX ADMIN — OFLOXACIN 1 DROP: 3 SOLUTION/ DROPS OPHTHALMIC at 07:53

## 2019-10-09 RX ADMIN — FLURBIPROFEN SODIUM 1 DROP: 0.3 SOLUTION/ DROPS OPHTHALMIC at 07:43

## 2019-10-09 RX ADMIN — FLURBIPROFEN SODIUM 1 DROP: 0.3 SOLUTION/ DROPS OPHTHALMIC at 07:35

## 2019-10-09 RX ADMIN — PHENYLEPHRINE HYDROCHLORIDE 1 DROP: 25 SOLUTION/ DROPS OPHTHALMIC at 07:35

## 2019-10-09 RX ADMIN — TROPICAMIDE 1 DROP: 10 SOLUTION/ DROPS OPHTHALMIC at 07:35

## 2019-10-09 RX ADMIN — TROPICAMIDE 1 DROP: 10 SOLUTION/ DROPS OPHTHALMIC at 07:43

## 2019-10-09 RX ADMIN — PROPARACAINE HYDROCHLORIDE 1 DROP: 5 SOLUTION/ DROPS OPHTHALMIC at 07:30

## 2019-10-09 ASSESSMENT — LIFESTYLE VARIABLES: TOBACCO_USE: 1

## 2019-10-09 NOTE — ANESTHESIA CARE TRANSFER NOTE
Patient: Jesse Guerrero    Procedure(s):  VITRECTOMY, PARS PLANA APPROACH, USING 23-GAUGE INSTRUMENTS, removal of retained lens material, endolaser  INSERTION, IMPLANT, EYE VALVE, with half moon corneal graft, avastin injection    Diagnosis: Vitreous prolapse, left eye [H43.02]  Diagnosis Additional Information: No value filed.    Anesthesia Type:   MAC     Note:  Airway :Room Air  Patient transferred to:Phase II  Comments: Samuel Report: Identifed the Patient, Identified the Reponsible Provider, Reviewed the pertinent medical history, Discussed the surgical course, Reviewed Intra-OP anesthesia mangement and issues during anesthesia, Set expectations for post-procedure period and Allowed opportunity for questions and acknowledgement of understanding      Vitals: (Last set prior to Anesthesia Care Transfer)    CRNA VITALS  10/9/2019 1444 - 10/9/2019 1522      10/9/2019             NIBP:  175/78    NIBP Mean:  106                Electronically Signed By: JEANINE Flaherty CRNA  October 9, 2019  3:22 PM

## 2019-10-09 NOTE — OP NOTE
Pre-operative diagnosis: Left Eye Glaucoma   Post-operative diagnosis Same   Procedure:    Anesthesia: Procedure(s):  Ahmed Tube Insertion, Left  Retrobulbar Block   Surgeon: Rubi Estrada MD   Assistants(s): None   Estimated blood loss: Minimal                         Specimens: None   Findings:  Complications: None  None       Indication: Patient was noted to have glaucoma with uncontrolled intraocular pressures despite maximally tolerated medical therapy.       After informed consent was obtained, the patient was wheeled to the operative suite. Preoperatively the patient received a retrobulbar block consisting of a 1:1 mixture of 2% Lidocaine and 0.75% Bupivicaine under propofol anesthesia. Patient was then prepped and draped in the usual sterile fashion. A lid speculum was placed between the left upper and lower eyelids. Dr. Ryan performed the pars plana vitrectomy and pars plana lensectomy, please see his op note for details.  A 0.12 forceps and Vannas scissors were used to extend the conjunctival peritomy from the 3 o'clock to the 12 o'clock location. A Juana scissors and nontoothed forceps were then used to dissect free the tenons attachments.  A relaxing conjunctival incisions was created at the 12 o'clock location using the Juana scissors and non-toothed forceps. Hemostasis was maintained with wet-field electrocautery. A slime's tenotomy scissors and non-toothed forceps were used to enlarge the pocket in the superotemporal quadrant. Next, an Ahmed valve implant, model FP7, was obtained. A tube inserting forceps and BSS on a 27 gauge cannula were then used to prime the implant and it was found to be in good working order. A caliper was used to measure a location 8mm posterior to the limbus in the superotemporal quadrant. Two simple 8-0 nylon interrupted sutures were used to adhere the plate to the episclera in the superotemporal quadrant 8mm posterior to the limbus. The tube was once  again checked using a tube inserting forceps and BSS on a 27 gauge cannula and it was found to be in good working order.  A tube inserting forceps and Vannas scissors were then used to cut the tube to the appropriate length with a bevel up configuration. A 23 gauge needle and 0.12 forceps were then used to make a tunnel into the posterior chamber from approximately 3mm posterior to the limbus. The tube inserting forceps and 0.12 forceps were then used to insert the tube into the posterior chamber.  A 10-0 nylon suture was then used to adhere the tube to the episclera.  Halo cornea patch graft was then adhered to the episclera using two simple 10-0 nylon interrupted sutures at the leading edges of the graft. The graft was noted to cover the tube well. The conjunctiva was reapproximated to the limbus using 2 simple 10-0 nylon interrupted sutures. The conjunctiva was reapproximated back to itself in the superior and temporal quadrants using a 7-0 vicryl running suture. At the conclusion of the case the patient received subconjunctival injections of dexamethasone and cefazolin as well as an intravitreal injection of Avastin.  The patient then had Maxitrol ophthalmic ointment applied to the ocular surface of the left eye as well as a pressure patch and alcaraz shield.   The patient was wheeled to the recovery area in stable condition.

## 2019-10-09 NOTE — DISCHARGE INSTRUCTIONS
[facility]  [phone]  Post Operative Eye Surgery Instructions    MD Aissatou Bah MD  Will I have pain?  Some discomfort is normal and expected following surgery. The first few days after surgery you may need to use prescription pain pills. Taking Tylenol (acetaminophen) regularly may help prevent pain.  Discomfort should gradually decrease and Tylenol should be sufficient to relieve pain. A foreign body sensation in the cornea of the eye is very common and caused by sutures placed at surgery. These sutures will go away in one to two weeks. If the pain worsens, you should call the doctor.  Do I need to wear an eye patch?  You do not need to wear an eye patch at home after the doctor has removed the patch on your first day after surgery. However, you may be more comfortable wearing a patch outside in the sun, when sleeping or napping, or in a donte, windy environment.  How much drainage should I have?  You may expect a moderate amount of drainage for a week. Gradually the drainage should decrease. The lids can be cleaned with a clean washcloth and gentle soap or diluted baby shampoo. Wipe the eyelids gently from the nose outward. Some blood in the tears is a normal finding.  Will there be swelling?  Some swelling is normal for about a week or two after which it will gradually decrease. Applying a cool compress, using a clean washcloth for 5 - 10 minutes several times a day may reduce the swelling and make you more comfortable. People may have some swelling of both eyes, especially if face down positioning is required. The white part of the eye may appear very red or bloody for a week or two. This may get worse a few days after surgery. Though the bright red appearance can look frightening, it is a normal finding early after surgery and will resolve in a few weeks.  Will I need to use eye drops?  You will be using several different kinds of eye drops or ointment (salve) when you  leave the hospital. The directions will be on each bottle or tube. The medication with the red top will keep your eye dilated and may make your eye more sensitive to light. Wearing sunglasses may help. The other medication is a combination antibiotic/steroid to prevent infection and promote healing.  Occasionally a third drop is used to control the pressure in your eye. A new bottle of artificial tears or lubricant ointment may be used along with your prescription eye drops after surgery. You will be using drops from four to eight weeks. Bring all eye medications (drops. ointments, or pills) with you  to each visit.   Always wash your hands before putting in the eye drops. You may wish to have someone else help you. Pull down on the lower lid and squeeze one drop from the bottle being careful not to touch the dropper to your eye or eyelid. One drop is sufficient, but another may be used if the first did not go into the eye. It is often easier to put in the drops if you are reclining or lying down. Wait five (5) minutes after the first drop before using the second drop to allow the medications to absorb into the eye.  How long will it take for my vision to improve?  Your vision should gradually improve, but it may take up to six months to regain your best vision. Frequently, air or gas bubbles are injected into the eye at the time of surgery. This will blur your vision significantly at first. As the bubble becomes smaller it will cause a black line in your vision that moves as you move your head. As the bubble becomes smaller you may notice that it looks more like a bubble or that it will break up into several smaller bubbles. It will take from a few days to a few weeks for the bubble to dissolve and be replaced by clear fluid.  You may notice floaters or double vision after your surgery. These symptoms usually will decrease with time. If the double vision is bothersome patching the eye may help.  If you notice a  sudden worsening in your vision call your doctor.  Are there any physical restrictions after surgery?  Heavy lifting (greater than 50 pounds), swimming and contact sports should be avoided for about 3 to 4 weeks after surgery.  You may resume your usual sexual activities about one week after surgery.  When may I return to work or my normal activities?  Depending on the type or work, you may return to work within a few days. If your work involves physical activity or driving, you will need to restrict your activities and remain home longer.  You may watch television, look at magazines, or work puzzles. Reading may be uncomfortable for several days, but using the eyes will not cause any damage.  You may go outside as usual. If conditions are windy or donte, wear an eye pad to avoid getting dust or dirt in the eye.  Can I travel?  You cannot fly in an airplane or drive into the mountains as long as the air or gas bubble remains in your eye.    Are there any driving restrictions?  Someone will need to drive you home from the hospital. Generally driving can be resumed in several days if you have good vision in your other eye. If you do not feel comfortable driving, do not drive! Your depth perception will be decreased so you will want to try driving during the day in light traffic until you feel comfortable driving. You should restrict your driving while you are taking prescription pain pills as they also can affect your judgment.  When can I shower and wash my hair?  You may shower or bathe when you get home, but avoid getting water in your eye. You may want someone to help you shampoo your hair at first.  You may shave, brush your teeth, or comb your hair. Do not use make-up, mascara, or creams/lotions around your eye for several weeks.  When will I see the doctor again?  Generally, you will be seen the first day after surgery and again 1-2 weeks later. If you have not received a return appointment before leaving the  hospital, you should call our office during the business hours to arrange an appointment. If you will be seeing your local doctor instead of us, you will need to call that office to set up an appointment.    If you have a green armband on, your physician will instruct you as to how long you will have to wear it at your post-operative follow-up appointment.  How do I reach a doctor if I have concerns?  One of our doctors is available by calling [facility] [phone]. Please try to call for routine questions and prescription refills during business hours.  You should call your doctor if:  You notice a sudden decrease in your vision.  Have severe pain or pain increases rather than subsiding.  You notice a new black curtain over your eye that is not the gas bubble. If you have any of these symptoms, you may need to be examined.        Home Care Following Anesthesia  For 24 hours after surgery:  1. Get plenty of rest.  A responsible adult must stay with you for at least 24 hours after you leave the surgery center.  2. Do not drive or use heavy equipment.  If you have weakness or tingling, don't drive or use heavy equipment until this feeling goes away.   3. Do not drink alcohol.   4. Avoid strenuous or risky activities.  Ask for help when climbing stairs.  5. You may feel lightheaded.  IF so, sit for a few minutes before standing.  Have someone help you get up.   6. If you have nausea (feel sick to your stomach): Drink only clear liquids such as apple juice, ginger ale, broth or 7-Up.  Rest may also help.  Be sure to drink enough fluids.  Move to a regular diet as you feel able.   7. You may have a slight fever.  Call the doctor if your fever is over 100 F (37.7 C) (taken under the tongue) or lasts longer than 24 hours.  8. You may have a dry mouth, a sore throat, muscle aches or trouble sleeping. These should go away after 24 hours.  9. Do not make important or legal decisions.                Tips for taking pain medications  To get the best pain relief possible, remember these points:    Take pain medications as directed, before pain becomes severe.    Pain medication can upset your stomach: taking it with food may help.    Constipation is a common side effect of pain medication. Drink plenty of  fluids.    Eat foods high in fiber. Take a stool softener if recommended by your doctor or pharmacist.    Do not drink alcohol, drive or operate machinery while taking pain medications.    Ask about other ways to control pain, such as with heat, ice or relaxation.    Tylenol/Acetaminophen Consumption  To help encourage the safe use of acetaminophen, the makers of TYLENOL  have lowered the maximum daily dose for single-ingredient Extra Strength TYLENOL  (acetaminophen) products sold in the [place] from 8 pills per day (4,000 mg) to 6 pills per day (3,000 mg). The dosing interval has also changed from 2 pills every 4-6 hours to 2 pills every 6 hours.    If you feel your pain relief is insufficient, you may take Tylenol/Acetaminophen in addition to your narcotic pain medication.     Be careful not to exceed 3,000 mg of Tylenol/Acetaminophen in a 24 hour period from all sources.    If you are taking extra strength Tylenol/acetaminophen (500 mg), the maximum dose is 6 tablets in 24 hours.    If you are taking regular strength acetaminophen (325 mg), the maximum dose is 9 tablets in 24 hours.    Call a doctor for any of the followin. Signs of infection (fever, growing tenderness at the surgery site, a large amount of drainage or bleeding, severe pain, foul-smelling drainage, redness, swelling).  2. It has been over 8 to 10 hours since surgery and you are still not able to urinate (pass water).  3. Headache for over 24 hours.  Your doctor is:  Dr. Rubi Estrada, Opthalmology: [phone]                 Or dial [phone] and ask for the resident on call for:  Ophthalmology  For emergency care, call the:  [facility]:  [phone] (TTY for hearing impaired: [phone])

## 2019-10-09 NOTE — ANESTHESIA POSTPROCEDURE EVALUATION
Anesthesia POST Procedure Evaluation    Patient: Jesse Guerrero   MRN:     [id number] Gender:   male   Age:    78 year old :      1940        Preoperative Diagnosis: Vitreous prolapse, left eye [H43.02]   Procedure(s):  VITRECTOMY, PARS PLANA APPROACH, USING 23-GAUGE INSTRUMENTS, removal of retained lens material, endolaser  INSERTION, IMPLANT, EYE VALVE, with half moon corneal graft, avastin injection   Postop Comments: No value filed.       Anesthesia Type:  Not documented  MAC    Reportable Event: NO     PAIN: Uncomplicated   Sign Out status: Comfortable, Well controlled pain     PONV: No PONV   Sign Out status:  No Nausea or Vomiting     Neuro/Psych: Uneventful perioperative course   Sign Out Status: Preoperative baseline; Age appropriate mentation     Airway/Resp.: Uneventful perioperative course   Sign Out Status: Non labored breathing, age appropriate RR; Resp. Status within EXPECTED Parameters     CV: Uneventful perioperative course   Sign Out status: Appropriate BP and perfusion indices; Appropriate HR/Rhythm     Disposition:   Sign Out in:  PACU  Disposition:  Phase II; Home  Recovery Course: Uneventful  Follow-Up: Not required           Last Anesthesia Record Vitals:  CRNA VITALS  10/9/2019 1444 - 10/9/2019 1544      10/9/2019             NIBP:  175/78    NIBP Mean:  106          Last PACU Vitals:  Vitals Value Taken Time   /90 10/9/2019  3:22 PM   Temp     Pulse 64 10/9/2019  3:22 PM   Resp     SpO2 97 % 10/9/2019  3:22 PM   Temp src     NIBP 175/78 10/9/2019  3:16 PM   Pulse 56 10/9/2019  3:14 PM   SpO2 100 % 10/9/2019  3:14 PM   Resp     Temp     Ht Rate 55 10/9/2019  3:14 PM   Temp 2     Vitals shown include unvalidated device data.      Electronically Signed By: Stanislav Land MD, MD, 2019, 3:53 PM

## 2019-10-09 NOTE — ANESTHESIA CARE TRANSFER NOTE
Patient: Jesse Guerrero    Procedure(s):  Left Eye Complex Cataract Surgery    Diagnosis: Cataract and Glaucoma  Diagnosis Additional Information: No value filed.    Anesthesia Type:   MAC     Note:  Airway :Room Air  Patient transferred to:Phase II  Handoff Report: Identifed the Patient, Identified the Reponsible Provider, Reviewed the pertinent medical history, Discussed the surgical course, Reviewed Intra-OP anesthesia mangement and issues during anesthesia, Set expectations for post-procedure period and Allowed opportunity for questions and acknowledgement of understanding      Vitals: (Last set prior to Anesthesia Care Transfer)    CRNA VITALS  10/9/2019 0916 - 10/9/2019 0951      10/9/2019             Resp Rate (set):  10                Electronically Signed By: JEANINE Cohen CRNA  October 9, 2019  9:51 AM

## 2019-10-09 NOTE — ANESTHESIA POSTPROCEDURE EVALUATION
Anesthesia POST Procedure Evaluation    Patient: Jesse Guerrero   MRN:     [id number] Gender:   male   Age:    78 year old :      1940        Preoperative Diagnosis: Cataract and Glaucoma   Procedure(s):  Left Eye Complex Cataract Surgery   Postop Comments: No value filed.       Anesthesia Type:  Not documented  MAC    Reportable Event: NO     PAIN: Uncomplicated   Sign Out status: Comfortable, Well controlled pain     PONV: No PONV   Sign Out status:  No Nausea or Vomiting     Neuro/Psych: Uneventful perioperative course   Sign Out Status: Preoperative baseline; Age appropriate mentation     Airway/Resp.: Uneventful perioperative course   Sign Out Status: Non labored breathing, age appropriate RR; Resp. Status within EXPECTED Parameters     CV: Uneventful perioperative course   Sign Out status: Appropriate BP and perfusion indices; Appropriate HR/Rhythm     Disposition:   Sign Out in:  PACU  Disposition:  Phase II; Home  Recovery Course: Uneventful  Follow-Up: Not required           Last Anesthesia Record Vitals:  CRNA VITALS  10/9/2019 0916 - 10/9/2019 1016      10/9/2019             Resp Rate (set):  10          Last PACU Vitals:  Vitals Value Taken Time   /76 10/9/2019  9:50 AM   Temp 36.5  C (97.7  F) 10/9/2019  9:50 AM   Pulse     Resp 14 10/9/2019  9:50 AM   SpO2 97 % 10/9/2019  9:50 AM   Temp src     NIBP 146/78 10/9/2019  9:43 AM   Pulse 54 10/9/2019  9:45 AM   SpO2 98 % 10/9/2019  9:45 AM   Resp     Temp     Ht Rate 53 10/9/2019  9:45 AM   Temp 2           Electronically Signed By: Stanislav Land MD, MD, 2019, 10:41 AM

## 2019-10-09 NOTE — OP NOTE
Pre-operative diagnosis: Mature, Total Left Eye Cataract   Post-operative diagnosis Same   Procedure:    Anesthesia: Procedure(s):  Complex Cataract Extraction, left  MAC/Retrobulbar   Surgeon: Rubi Estrada MD   Assistants(s): None   Estimated blood loss: Minimal                         Specimens: None   Findings:  Complications: None  Posterior lens and capsule dislocation into vitreous       Indication: Patient presented with a dense, brunescent cataract in the left eye which prevented any view of the retina. 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  Patient firmly stated that his vision had decreased over the last several years in the left eye and wanted to proceed with surgery to try and improve the vision in the left eye.           After informed consent was obtained, the patient was wheeled to the operative suite. Preoperatively, the patient received a retrobulbar block consisting of 1:1 mixture of 2% Lidocaine and 0.75% Bupivicaine under propofol anesthesia.  Patient was then prepped and draped in the usual sterile fashion. A lid speculum was placed between the upper and lower eyelids.  A 1mm sideport blade was used to make 5 paracenteses.  4 iris hooks were placed thereby dilating the pupil.  Sterile air was then injected into the anterior chamber thereby creating an air bubble.  Trypan blue was then injected thereby painting the anterior lens capsule.  BSS was used to flush the Trypan from the anterior chamber.  Viscoat was then injected through the paracentesis.  The 0.12 forceps was once again used to stabilize the globe and a 2.5mm steel keratome blade was used to make a temporal clear corneal incision extending 1.5mm prior to anterior chamber entry.  A bent cystitome needle was then used to initiate the rhexis.  The Utrata forceps were then used to try and propogate the rhexis.  The  anterior capsule scarring prevented creating a continuous curvilinear capsulorrhexis temporally.  The rhexis was then re-initiated superiorly and propogated nasally.  The anterior lens capsule scarring inferonasally prevented finishing the rhexis.  A duet scissor was then used to complete the rhexis inferiorly.  The viscoat cannula was then used to perform gentle hydrodissection.   The lens was noted to be freely moveable within the capsular bag.  Phacoemulsification was then used to remove the lens nucleus.  The lens was noted to be markedly dense and was largely debulked centrally.  Viscoat was once again re-injected to try and float the nucleus out of the bag.  Upon reinsertion of the phaco handpiece, the entire lens and capuslar bag was noted to fall posteriorly out of view.  A simple 10-0 nylon interrupted suture was then placed through the temporal wound.  The 4 iris hooks were removed.    BSS on a 27 gauge cannula was then used to hydrate the stromal portions of the wounds.  Weck-cells were used to check the wounds and they were found to be well secured.  Maxitrol ointment was applied to the ocular surface of the left eye.  A pressure patch and alcaraz eye shield were then placed over the operative eye before being wheeled to the recovery area.

## 2019-10-09 NOTE — OP NOTE
PRE-OP Dx:    1) Retained lens material OS   2) NVG   3) CRVO    Post-OP Dx: same    Attending:  HEATHER Ryan MD    Anesthesia: MAC with RB  Procedures:   1) Pars plana vitrectomy (PPV) 25g left eye   2) Pars plana lensectomy (PPL)  Eye   3) Endolaser OS   4) Tube placement (Dr. 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  After informed consent was obtained, they was brought into the OR where retrobulbar anesthesia was administered.  The eye was then prepped and draped in the usual fashion for ophthalmic surgery.    Attention was then turned to the vitrectomy.  Marks were made on the sclera inferotemporally,  superotemporally, and superonasally 3.5 mm posterior to the limbus.  The 25g transscleral cannulas were inserted through the sclera using the trocars.  The infusion cannula was connected to the inferonasal cannula and directly visualized to verify it was in the correct location.      The vitrector was used to clear lens debris anteriorly under direct visualization and to remove residual cortex from the lens capsule.  Next a vitrectomy was performed   and the  vitreous was stained with kenalog.   A limited conjunctival peritomy was performed and the 20g MVR blade used to create a temporal sclerotomy for the fragmatome.  The fragmatome was used to remove the retained nuclear and cortical material.  The endolaser was used to perform .    The periphery was examined with depression 360.  No breaks or tears were found.   The cannulas were removed and the temporal 20g sclerotomy was sutured with 7-0 vicryl.  All sclerotomies  were determined to be sealed.      At this point, Dr. Estrada completed his tube  placement.  See his note for details.    Rhianna Ryan MD
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
## 2019-10-10 ENCOUNTER — OFFICE VISIT (OUTPATIENT)
Dept: OPHTHALMOLOGY | Facility: CLINIC | Age: 79
End: 2019-10-10
Attending: OPHTHALMOLOGY
Payer: COMMERCIAL

## 2019-10-10 DIAGNOSIS — H40.52X0 NVG (NEOVASCULAR GLAUCOMA), LEFT, STAGE UNSPECIFIED: Primary | ICD-10-CM

## 2019-10-10 DIAGNOSIS — H27.02 APHAKIA OF LEFT EYE: ICD-10-CM

## 2019-10-10 PROCEDURE — G0463 HOSPITAL OUTPT CLINIC VISIT: HCPCS | Mod: ZF

## 2019-10-10 ASSESSMENT — VISUAL ACUITY
OS_SC: LP
METHOD: SNELLEN - LINEAR
OD_SC: 20/40

## 2019-10-10 ASSESSMENT — SLIT LAMP EXAM - LIDS
COMMENTS: DERMATOCHALASIS
COMMENTS: DERMATOCHALASIS

## 2019-10-10 ASSESSMENT — EXTERNAL EXAM - RIGHT EYE: OD_EXAM: NORMAL

## 2019-10-10 ASSESSMENT — CUP TO DISC RATIO: OS_RATIO: ?0.9

## 2019-10-10 ASSESSMENT — TONOMETRY
OS_IOP_MMHG: 09
OD_IOP_MMHG: 16
IOP_METHOD: APPLANATION

## 2019-10-10 ASSESSMENT — EXTERNAL EXAM - LEFT EYE: OS_EXAM: NORMAL

## 2019-10-10 NOTE — PATIENT INSTRUCTIONS
Patient will discontinue and hold onto Cosopt (Timolol/Dorzolamide) which is a blue top drop and Alphagan (Brimonidine) which is a purple top drop.  Patient will follow up with retina clinic for Avastin injection prior to surgery and for PRP laser after the surgery.     No heavy lifting, bending, stooping, straining, rubbing the eye, or getting water in the eye.  Please wear protective eyewear over the eye at all times including glasses during the day and the protective shield at bedtime.  Patient will return to clinic in 1 week with repeat evaluation.    Use the following drops (LEFT EYE ONLY):  Antibiotic --  Ciprofloxacin: 4x/day until finished (use for at least 5-7 days after surgery)    Steroid -- Pred Forte/Prednisolone Acetate: every 2 hours while awake    Please be sure to call if you have any decrease in vision, increase in pain, flashing lights, redness, or a lot of drainage.

## 2019-10-10 NOTE — PROGRESS NOTES
"1)NVG OS -- s/p PP tube OS (10/9/19), s/p mCPC OS (7/30/18), H/O noncompliance with gtts per outside notes -- K pachy:555/634    Tmax:     HVF:  OD:Fluct    CDR: OD:0.5    HRT/OCT: OD:RNFL WNL    FHX of Glc: No      Gonio:       Intolerant to: Diamox -- fatigue, feeling tired      Asthma/COPD: No   Steroid Use: No    Kidney Stones: No    Sulfa Allergy: No      IOP targets:   2)PCIOL OD c PCO OD -- done in [place]   3)Aphakia OS -- H/O complicated CE OS (whole capsular bag and cataract dislocated in vitreous) s/p PPV/PPL (10/9/19 with Dr. Ryan) -- will consider sutured IOL pending evaluation of [facility] potential postop   4)??H/O CRVO OS per old notes s/p Avastin OS and s/p PRP OS(10/9/19 in the OR) -- was previously following at  with Dr. Espinoza -- pt reports decrease in vision in April 2014 upon awakening in morning (denies H/O trauma prior to vision loss) -- pt was evaluated in [place] after vision loss and they did not offer cataract surgery OS because it would not improve his vision ... Was told eye was \"full of blood\"  -- pt reports vision OS in 2018 is the same as it was in 2015 -- states that he lost all vision in 2014 -- very guarded visual prognosis -- following with Dr. Ryan    MD:pt needs  -- will call pt to schedule    MD: (11/18) pt now wanting to consider surgery OS -- pt feels vision is better and that he has HM vision in bright sunlight  -- B scan done on OS (7/2019) that did not reveal RD and an ERG that was markedly abnormal -- poor visual potential based on ERG testing    MD: Patient was very aware of the risk for minimally improved vision but adamantly wants to proceed with surgery in the left eye even if it results in the need for eye removal in the future due to complications.          Patient will discontinue and hold onto Cosopt (Timolol/Dorzolamide) which is a blue top drop and Alphagan (Brimonidine) which is a purple top drop.  Patient will follow up with Dr." Shelby.     No heavy lifting, bending, stooping, straining, rubbing the eye, or getting water in the eye.  Please wear protective eyewear over the eye at all times including glasses during the day and the protective shield at bedtime.  Patient will return to clinic in 1 week with repeat evaluation.    Use the following drops (LEFT EYE ONLY):  Antibiotic --  Ciprofloxacin: 4x/day until finished (use for at least 5-7 days after surgery)    Steroid -- Pred Forte/Prednisolone Acetate: every 2 hours while awake    Please be sure to call if you have any decrease in vision, increase in pain, flashing lights, redness, or a lot of drainage.          Resident note (educational purposes, please refer to text above for final A&P):  POD#1 s/p Left complex cataract extraction, vitrectomy/lensectomy, endolaser, Ahmed tube with corneal patch graft and intravitreal Avastin injection (10/9/19)  Did well overnight, no pain or discomfort  States able to see bright lights. Vision remains LP  Start Ciprofloxacin QID for 5-7 days  Start Pred Forte q2hrs while awake  Hold IOP drops in the left eye for now  Pos-op instructions reviewed. No eye rubbing, heavy lifting, bending or straining. Wear eye shield at night and eye protection during the day    Seng Anderson MD  Ophthalmology Resident, PGY-3          Attending Physician Attestation:  Complete documentation of historical and exam elements from today's encounter can be found in the full encounter summary report (not reduplicated in this progress note). I personally obtained the chief complaint(s) and history of present illness.  I confirmed and edited as necessary the review of systems, past medical/surgical history, family history, social history, and examination findings as documented by others; and I examined the patient myself. I personally reviewed the relevant tests, images, and reports as documented above. I formulated and edited as necessary the assessment and plan and  discussed the findings and management plan with the patient and family.  - Rubi Estrada MD

## 2019-10-10 NOTE — NURSING NOTE
Chief Complaints and History of Present Illnesses   Patient presents with     Post Op (Ophthalmology) Left Eye     Chief Complaint(s) and History of Present Illness(es)     Post Op (Ophthalmology) Left Eye     Laterality: left eye    Associated symptoms: Negative for eye pain    Pain scale: 0/10              Comments     1 day post op LE CAT and  Tube  Had good night   Dbebie Ruth COA 2:12 PM October 10, 2019

## 2019-10-22 ENCOUNTER — OFFICE VISIT (OUTPATIENT)
Dept: OPHTHALMOLOGY | Facility: CLINIC | Age: 79
End: 2019-10-22
Attending: OPHTHALMOLOGY
Payer: COMMERCIAL

## 2019-10-22 DIAGNOSIS — H27.02 APHAKIA OF LEFT EYE: ICD-10-CM

## 2019-10-22 DIAGNOSIS — H40.52X0 NVG (NEOVASCULAR GLAUCOMA), LEFT, STAGE UNSPECIFIED: Primary | ICD-10-CM

## 2019-10-22 DIAGNOSIS — H40.52X3 NVG (NEOVASCULAR GLAUCOMA), LEFT, SEVERE STAGE: ICD-10-CM

## 2019-10-22 PROCEDURE — G0463 HOSPITAL OUTPT CLINIC VISIT: HCPCS | Mod: ZF

## 2019-10-22 ASSESSMENT — VISUAL ACUITY
OD_SC+: +1
METHOD: SNELLEN - LINEAR
OD_PH_SC: 20/30
OD_SC: 20/40

## 2019-10-22 ASSESSMENT — TONOMETRY
OD_IOP_MMHG: 20
OD_IOP_MMHG: 18
OS_IOP_MMHG: 15
IOP_METHOD: APPLANATION
IOP_METHOD: TONOPEN

## 2019-10-22 ASSESSMENT — REFRACTION_WEARINGRX
OD_SPHERE: -0.50
OD_ADD: +3.50
OD_AXIS: 030
OD_CYLINDER: +0.50
OS_SPHERE: BALANCE

## 2019-10-22 ASSESSMENT — SLIT LAMP EXAM - LIDS
COMMENTS: DERMATOCHALASIS
COMMENTS: DERMATOCHALASIS

## 2019-10-22 ASSESSMENT — EXTERNAL EXAM - LEFT EYE: OS_EXAM: NORMAL

## 2019-10-22 ASSESSMENT — EXTERNAL EXAM - RIGHT EYE: OD_EXAM: NORMAL

## 2019-10-22 ASSESSMENT — CUP TO DISC RATIO: OS_RATIO: 0.99

## 2019-10-22 NOTE — PATIENT INSTRUCTIONS
Patient will discontinue and hold onto Cosopt (Timolol/Dorzolamide) which is a blue top drop and Alphagan (Brimonidine) which is a purple top drop.  Patient will follow up with Dr. Ryan.     No heavy lifting, bending, stooping, straining, rubbing the eye, or getting water in the eye.  Please wear protective eyewear over the eye at all times including glasses during the day and the protective shield at bedtime.  Patient will return to the glaucoma clinic in 4-6 weeks with repeat evaluation and with Dr. Ryan in 2 weeks. .    Use the following drops (LEFT EYE ONLY):  Antibiotic --  Ciprofloxacin: Discontinue    Steroid -- Pred Forte/Prednisolone Acetate: every 2 hours while awake for 1 week then 4x/day until seen    Please be sure to call if you have any decrease in vision, increase in pain, flashing lights, redness, or a lot of drainage.

## 2019-10-22 NOTE — PROGRESS NOTES
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 Ryan) -- will consider sutured IOL pending evaluation of [facility] potential postop   4)??H/O CRVO OS per old notes s/p Avastin OS and s/p PRP OS(10/9/19 in the OR) -- was previously following at  with Dr. Espinoza -- pt reports decrease in vision in April 2014 upon awakening in morning (denies H/O trauma prior to vision loss) -- pt was evaluated in [place] after vision loss and they did not offer cataract surgery OS because it would not improve his vision ... Was told eye was \"full of blood\"  -- pt reports vision OS in 2018 is the same as it was in 2015 -- states that he lost all vision in 2014 -- very guarded visual prognosis -- following with Dr. Ryan    MD:pt needs  -- will call pt to schedule    MD: (11/18) pt now wanting to consider surgery OS -- pt feels vision is better and that he has HM vision in bright sunlight  -- B scan done on OS (7/2019) that did not reveal RD and an ERG that was markedly abnormal -- poor visual potential based on ERG testing    MD: Patient was very aware of the risk for minimally improved vision but adamantly wants to proceed with surgery in the left eye even if it results in the need for eye removal in the future due to complications.          Patient will discontinue and hold onto Cosopt (Timolol/Dorzolamide) which is a blue top drop and Alphagan (Brimonidine) which is a purple top drop.  Patient will follow up with Dr." Shelby.     No heavy lifting, bending, stooping, straining, rubbing the eye, or getting water in the eye.  Please wear protective eyewear over the eye at all times including glasses during the day and the protective shield at bedtime.  Patient will return to the glaucoma clinic in 4-6 weeks with repeat evaluation and with Dr. Ryan in 2 weeks. .    Use the following drops (LEFT EYE ONLY):  Antibiotic --  Ciprofloxacin: Discontinue    Steroid -- Pred Forte/Prednisolone Acetate: every 2 hours while awake for 1 week then 4x/day until seen    Please be sure to call if you have any decrease in vision, increase in pain, flashing lights, redness, or a lot of drainage.        Attending Physician Attestation:  Complete documentation of historical and exam elements from today's encounter can be found in the full encounter summary report (not reduplicated in this progress note). I personally obtained the chief complaint(s) and history of present illness.  I confirmed and edited as necessary the review of systems, past medical/surgical history, family history, social history, and examination findings as documented by others; and I examined the patient myself. I personally reviewed the relevant tests, images, and reports as documented above. I formulated and edited as necessary the assessment and plan and discussed the findings and management plan with the patient and family.  - Rubi Estrada MD

## 2019-10-22 NOTE — NURSING NOTE
Chief Complaints and History of Present Illnesses   Patient presents with     Post Op (Ophthalmology) Left Eye     Chief Complaint(s) and History of Present Illness(es)     Post Op (Ophthalmology) Left Eye               Comments     POP s/p PP tube OS (10/9/19)  .Madeline Blue, COA, COA 3:24 PM 10/22/2019

## 2019-11-04 DIAGNOSIS — H34.8121 CENTRAL RETINAL VEIN OCCLUSION WITH NEOVASCULARIZATION OF LEFT EYE (H): Primary | ICD-10-CM

## 2019-11-05 ENCOUNTER — OFFICE VISIT (OUTPATIENT)
Dept: OPHTHALMOLOGY | Facility: CLINIC | Age: 79
End: 2019-11-05
Attending: OPHTHALMOLOGY
Payer: COMMERCIAL

## 2019-11-05 DIAGNOSIS — H33.321 RETINAL HOLE OF RIGHT EYE: ICD-10-CM

## 2019-11-05 DIAGNOSIS — H40.52X0 NVG (NEOVASCULAR GLAUCOMA), LEFT, STAGE UNSPECIFIED: ICD-10-CM

## 2019-11-05 DIAGNOSIS — Z98.890 POST-OPERATIVE STATE: ICD-10-CM

## 2019-11-05 DIAGNOSIS — H34.8121 CENTRAL RETINAL VEIN OCCLUSION WITH NEOVASCULARIZATION OF LEFT EYE (H): Primary | ICD-10-CM

## 2019-11-05 PROCEDURE — 92235 FLUORESCEIN ANGRPH MLTIFRAME: CPT | Mod: ZF | Performed by: OPHTHALMOLOGY

## 2019-11-05 PROCEDURE — 67145 PROPH RTA DTCHMNT PC: CPT | Mod: RT,ZF | Performed by: OPHTHALMOLOGY

## 2019-11-05 PROCEDURE — 92250 FUNDUS PHOTOGRAPHY W/I&R: CPT | Mod: ZF | Performed by: OPHTHALMOLOGY

## 2019-11-05 PROCEDURE — G0463 HOSPITAL OUTPT CLINIC VISIT: HCPCS | Mod: ZF

## 2019-11-05 PROCEDURE — 92134 CPTRZ OPH DX IMG PST SGM RTA: CPT | Mod: ZF | Performed by: OPHTHALMOLOGY

## 2019-11-05 RX ORDER — PREDNISOLONE ACETATE 10 MG/ML
1-2 SUSPENSION/ DROPS OPHTHALMIC 4 TIMES DAILY
COMMUNITY
End: 2019-12-05

## 2019-11-05 ASSESSMENT — CUP TO DISC RATIO
OS_RATIO: 0.99
OD_RATIO: 0.5

## 2019-11-05 ASSESSMENT — SLIT LAMP EXAM - LIDS
COMMENTS: NORMAL
COMMENTS: DERMATOCHALASIS

## 2019-11-05 ASSESSMENT — REFRACTION_WEARINGRX
OD_CYLINDER: +0.50
OD_ADD: +3.50
OS_SPHERE: BALANCE
OD_SPHERE: -0.50
OD_AXIS: 030

## 2019-11-05 ASSESSMENT — VISUAL ACUITY
METHOD: SNELLEN - LINEAR
OD_SC: 20/40
OD_PH_SC: 20/30
OS_SC: LP WITH PROJECTION
OD_SC+: -2

## 2019-11-05 ASSESSMENT — EXTERNAL EXAM - LEFT EYE: OS_EXAM: NORMAL

## 2019-11-05 ASSESSMENT — TONOMETRY
IOP_METHOD: TONOPEN
OS_IOP_MMHG: 13
OD_IOP_MMHG: 19

## 2019-11-05 ASSESSMENT — EXTERNAL EXAM - RIGHT EYE: OD_EXAM: NORMAL

## 2019-11-05 NOTE — NURSING NOTE
Chief Complaints and History of Present Illnesses   Patient presents with     Follow Up     Chief Complaint(s) and History of Present Illness(es)     Follow Up               Comments     Follow up for  CRVO left eye.   Madeline Blue, COA, COA 7:44 AM 11/05/2019

## 2019-11-05 NOTE — PROGRESS NOTES
"CC -   Post Op OS    INTERVAL HISTORY - POM #1 OS, s/p PPV/PPL with tube, 10/9/19  no pain, senses HM OS    HPI -   Jesse Guerrero is a  79 year old year-old patient with history of NVG and dense cataract OS presumed d/t CRVO OS.  Patient had sudden vision loss in 2014 in [place] upon awakening, no trauma, was told eye was \"full of blood\".  Per patient was told blood vessel broke OS, per records patient was told CE/IOL wouldn't help, but patient states he was offered the surgery in [place] for OS.      Had Avastin x 2 OS at [facility] prior to CPC in 2018        PAST OCULAR SURGERY  CE/IOL OD ([place])  mpCPC OS 7/2018 ([facility])  PPV/PPL/PRP with Tube OS 10/9/19    RETINAL IMAGING:  OCT 11-5-19  OD - tr ERM, focal irregular nasal macula  OS - severe CME, moderate ERM    FA 11-5-19  OD - normal filling  OS - severe ischemia macula & periphery, dense PRP    ffERG 7/8/19  OD - mild irregularities  OS - severe attenuation        ASSESSMENT & PLAN    0. POM #1 OS   - s/p PPV/PPL/PRP with Tube 10/9/19   - IOP OK, no infection, retina flat   - gtts per Sean      1.  CRVO OS with CME and NVG   - onset 4/2014 by history, ischemic   - s/p Avastin x 2 in 2018 (last 6/2018)    - s/p endolaser PRP        2. NVG OS   - s/p CPC 7/2018    - s/p tube 10/2019        3. NS OS   - precludes posterior view   - planned CE/IOL, see #1        4. Operculated hole OD   - given large size & monocular advise pexy   - r/b/a d/w patient   - laser pexy done today        return to clinic: 1 moth, Optos OU, OCT OU        ATTESTATION     Attending Attestation:     Complete documentation of historical and exam elements from today's encounter can be found in the full encounter summary report (not reduplicated in this progress note).  I personally obtained the chief complaint(s) and history of present illness.  I confirmed and edited as necessary the review of systems, past medical/surgical history, family history, social history, and examination " findings as documented by others; and I examined the patient myself.  I personally reviewed the relevant tests, images, and reports as documented above.  I formulated and edited as necessary the assessment and plan and discussed the findings and management plan with the patient and family    Rhianna Ryan MD, PhD  , Vitreoretinal Surgery  Department of Ophthalmology  [facility]

## 2019-12-05 ENCOUNTER — OFFICE VISIT (OUTPATIENT)
Dept: OPHTHALMOLOGY | Facility: CLINIC | Age: 79
End: 2019-12-05
Attending: OPHTHALMOLOGY
Payer: COMMERCIAL

## 2019-12-05 DIAGNOSIS — H27.02 APHAKIA OF LEFT EYE: ICD-10-CM

## 2019-12-05 DIAGNOSIS — H40.52X3 NVG (NEOVASCULAR GLAUCOMA), LEFT, SEVERE STAGE: Primary | ICD-10-CM

## 2019-12-05 PROCEDURE — G0463 HOSPITAL OUTPT CLINIC VISIT: HCPCS | Mod: ZF

## 2019-12-05 RX ORDER — PREDNISOLONE ACETATE 10 MG/ML
1 SUSPENSION/ DROPS OPHTHALMIC 4 TIMES DAILY
Qty: 5 ML | Refills: 3 | Status: SHIPPED | OUTPATIENT
Start: 2019-12-05

## 2019-12-05 ASSESSMENT — VISUAL ACUITY
OD_SC+: -2
OS_SC: LP WITH PROJECTION
OD_SC: 20/30
METHOD: SNELLEN - LINEAR
OD_PH_SC: 20/30
OD_PH_SC+: +2

## 2019-12-05 ASSESSMENT — EXTERNAL EXAM - RIGHT EYE: OD_EXAM: NORMAL

## 2019-12-05 ASSESSMENT — TONOMETRY
IOP_METHOD: APPLANATION
IOP_METHOD: APPLANATION
OD_IOP_MMHG: 14
OS_IOP_MMHG: 16
OS_IOP_MMHG: 12
OD_IOP_MMHG: 14

## 2019-12-05 ASSESSMENT — CUP TO DISC RATIO
OD_RATIO: 0.5
OS_RATIO: 0.99

## 2019-12-05 ASSESSMENT — SLIT LAMP EXAM - LIDS
COMMENTS: DERMATOCHALASIS
COMMENTS: NORMAL

## 2019-12-05 ASSESSMENT — EXTERNAL EXAM - LEFT EYE: OS_EXAM: NORMAL

## 2019-12-05 NOTE — PATIENT INSTRUCTIONS
Monocular precautions discussed.  Patient will discontinue and hold onto Cosopt (Timolol/Dorzolamide) which is a blue top drop and Alphagan (Brimonidine) which is a purple top drop.  Patient will follow up with Dr. Ryan.     No heavy lifting, bending, stooping, straining, rubbing the eye, or getting water in the eye.  Please wear protective eyewear over the eye at all times including glasses during the day and the protective shield at bedtime.  Patient will return to the glaucoma clinic in 2-3 months with repeat evaluation and with Dr. Ryan in 1 month.    Use the following drops (LEFT EYE ONLY):  Antibiotic --  Ciprofloxacin: Discontinue    Steroid -- Pred Forte/Prednisolone Acetate: 3x/day for 3 weeks then 2x/day for 3 weeks then 1x/day for 3 weeks then discontinue    Please be sure to call if you have any decrease in vision, increase in pain, flashing lights, redness, or a lot of drainage.

## 2019-12-05 NOTE — NURSING NOTE
Chief Complaints and History of Present Illnesses   Patient presents with     Post Op (Ophthalmology) Left Eye     Chief Complaint(s) and History of Present Illness(es)     Post Op (Ophthalmology) Left Eye     Laterality: left eye    Frequency: constantly    Timing: throughout the day    Course: stable    Associated symptoms: Negative for itching, redness, tearing and eye pain              Comments     POM #2 for tube placement of LE for hx: NVG.  Wants to discuss possibility of having IOL inserted to currently aphakic LE.  Stopped all post op drops to LE as instructed. Currently no eye meds or art tears to either eye.  States eyes are comfortable and VA unchanged.  RAHUL Arceo COT 7:36 AM 12/05/2019

## 2019-12-05 NOTE — PROGRESS NOTES
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 Ryan) -- will consider sutured IOL pending evaluation of VA potential postop   4)??H/O CRVO OS per old notes s/p Avastin OS and s/p PRP OS(10/9/19 in the OR) -- was previously following at  with Dr. Espinoza -- pt reports decrease in vision in April 2014 upon awakening in morning (denies H/O trauma prior to vision loss) -- pt was evaluated in [place] after vision loss and they did not offer cataract surgery OS because it would not improve his vision ... Was told eye was \"full of blood\"  -- pt reports vision OS in 2018 is the same as it was in 2015 -- states that he lost all vision in 2014 -- very guarded visual prognosis -- following with Dr. Ryan  5)H/O Operculated Hole OD s/p laser retinopexy (11/2019) -- following with Dr. Muñoz  6)K scar OD    MD:pt needs     MD: (11/18) pt now wanting to consider surgery OS -- pt feels vision is better and that he has HM vision in bright sunlight  -- B scan done on OS (7/2019) that did not reveal RD and an ERG that was markedly abnormal -- poor visual potential based on ERG testing    MD: Patient was very aware of the risk for minimally improved vision but adamantly wants to proceed with surgery in the left eye even if it results in the need for eye removal in the future due to complications.          Monocular precautions discussed.  Patient will discontinue and hold onto Cosopt (Timolol/Dorzolamide) " which is a blue top drop and Alphagan (Brimonidine) which is a purple top drop.  Patient will follow up with Dr. Ryan.     No heavy lifting, bending, stooping, straining, rubbing the eye, or getting water in the eye.  Please wear protective eyewear over the eye at all times including glasses during the day and the protective shield at bedtime.  Patient will return to the glaucoma clinic in 2-3 months with repeat evaluation, dilated eye exam and with Dr. Ryan in 1 month.    Use the following drops (LEFT EYE ONLY):  Antibiotic --  Ciprofloxacin: Discontinue    Steroid -- Pred Forte/Prednisolone Acetate: 3x/day for 3 weeks then 2x/day for 3 weeks then 1x/day for 3 weeks then discontinue    Please be sure to call if you have any decrease in vision, increase in pain, flashing lights, redness, or a lot of drainage.          Resident Note (for educational purposes, see above for Assessment and Plan):  NVG left eye s/p PP tube 10/9/19  IOP great today  Continue off IOP drops  Patient would like IOP implantation, discussed guarded prognosis given underlying retina and nerve damage, no improvement in VA with +lens      Rose Marie Son MD  Ophthalmology Resident, PGY-3          Attending Physician Attestation:  Complete documentation of historical and exam elements from today's encounter can be found in the full encounter summary report (not reduplicated in this progress note). I personally obtained the chief complaint(s) and history of present illness.  I confirmed and edited as necessary the review of systems, past medical/surgical history, family history, social history, and examination findings as documented by others; and I examined the patient myself. I personally reviewed the relevant tests, images, and reports as documented above. I formulated and edited as necessary the assessment and plan and discussed the findings and management plan with the patient and family.  - Rubi Estrada MD

## 2020-01-02 DIAGNOSIS — H34.8121 CENTRAL RETINAL VEIN OCCLUSION WITH NEOVASCULARIZATION OF LEFT EYE (H): Primary | ICD-10-CM

## 2023-06-19 ENCOUNTER — TELEPHONE (OUTPATIENT)
Dept: OPHTHALMOLOGY | Facility: CLINIC | Age: 83
End: 2023-06-19

## 2023-06-19 NOTE — TELEPHONE ENCOUNTER
The patient walked in and note left eye pain that kept him up at night.  I was making an appointment for today when he showed me his insurance card.  He has [employer] which [facility] doesn't accept.  The patient will go to a ealth  emergency department or another emergency department for eye pain.   He may call his insurance to see where his insurance is accepted.

## (undated) DEVICE — DRAPE MICRO 41X81"

## (undated) DEVICE — EYE KNIFE SLIT XSTAR VISITEC 2.5MM 45DEG BEVEL UP 373725

## (undated) DEVICE — SYR 01ML 27GA 0.5" NDL TBC 309623

## (undated) DEVICE — EYE PACK CONSTELLATION FRAGMATOME 20GA 8065750958

## (undated) DEVICE — TAPE MICROPORE 1"X1.5YD 1530S-1

## (undated) DEVICE — SU PLAIN 6-0 TG140-8 18" 1735G

## (undated) DEVICE — EYE NDL RETROBULBAR ATKINSON 25GA 1.5" 581637

## (undated) DEVICE — EYE KNIFE STILETTO VISITEC 1.1MM ANG 45DEG SIDEPORT 376620

## (undated) DEVICE — PACK VITRECTOMY PQ15VI57E

## (undated) DEVICE — SOL WATER 10ML VIAL 6332318510

## (undated) DEVICE — Device

## (undated) DEVICE — SU ETHILON 8-0 TG175-8 12" 1716G

## (undated) DEVICE — EYE CANNULA SOFT TIP 25GA 8065149525

## (undated) DEVICE — SU VICRYL 7-0 TG140-8DA 18" J546G

## (undated) DEVICE — GLOVE PROTEXIS MICRO 7.5  2D73PM75

## (undated) DEVICE — NDL 19GA 1.5"

## (undated) DEVICE — EYE BLADE SCLERAL MVR 20GA 8065912001

## (undated) DEVICE — SU ETHILON 10-0 CSM-6DA 8" 9006

## (undated) DEVICE — PEN MARKING SKIN TYCO DEVON DUAL TIP 31145868

## (undated) DEVICE — EYE CANN IRR 25GA CYSTOTOME 581610

## (undated) DEVICE — EYE PACK 25GA CONSTELLATION 10,000 CPM PPK9380-02

## (undated) DEVICE — EYE TIP IRRIGATION & ASPIRATION POLYMER 35D BENT 8065751511

## (undated) DEVICE — ESU CORD BIPOLAR GREEN 10-4000

## (undated) DEVICE — NDL 23GA 1" 305145

## (undated) DEVICE — EYE SHIELD PLASTIC

## (undated) DEVICE — EYE CANN IRR 27GA ANTERIOR CHAMBER 581280

## (undated) DEVICE — LINEN TOWEL PACK X5 5464

## (undated) DEVICE — NDL 30GA 0.5" 305106

## (undated) DEVICE — GLOVE PROTEXIS MICRO 7.0  2D73PM70

## (undated) DEVICE — EYE DRSG PAD OVAL

## (undated) DEVICE — APPLICATOR COTTON TIP 6"X2 STERILE LF 6012

## (undated) DEVICE — EYE TIP BIPOLAR 18GA ERASER 221250

## (undated) DEVICE — PACK CATARACT CUSTOM ASC SEY15CPUMC

## (undated) DEVICE — EYE PACK CUSTOM ANTERIOR 30DEG TIP CENTURION PPK6682-04

## (undated) DEVICE — SU ETHILON 10-0 CS160-6 12" 9000G

## (undated) RX ORDER — PROPOFOL 10 MG/ML
INJECTION, EMULSION INTRAVENOUS
Status: DISPENSED
Start: 2019-10-09

## (undated) RX ORDER — FENTANYL CITRATE 50 UG/ML
INJECTION, SOLUTION INTRAMUSCULAR; INTRAVENOUS
Status: DISPENSED
Start: 2019-10-09

## (undated) RX ORDER — LIDOCAINE HYDROCHLORIDE 20 MG/ML
INJECTION, SOLUTION EPIDURAL; INFILTRATION; INTRACAUDAL; PERINEURAL
Status: DISPENSED
Start: 2019-10-09

## (undated) RX ORDER — BEVACIZUMAB 2.5 MG/0.1
SYRINGE (ML) INTRAOCULAR
Status: DISPENSED
Start: 2019-10-09

## (undated) RX ORDER — ONDANSETRON 2 MG/ML
INJECTION INTRAMUSCULAR; INTRAVENOUS
Status: DISPENSED
Start: 2019-10-09

## (undated) RX ORDER — ACETAMINOPHEN 325 MG/1
TABLET ORAL
Status: DISPENSED
Start: 2019-10-09